# Patient Record
Sex: MALE | Race: WHITE | Employment: FULL TIME | URBAN - METROPOLITAN AREA
[De-identification: names, ages, dates, MRNs, and addresses within clinical notes are randomized per-mention and may not be internally consistent; named-entity substitution may affect disease eponyms.]

---

## 2017-05-10 ENCOUNTER — HOSPITAL ENCOUNTER (OUTPATIENT)
Dept: LAB | Age: 57
Discharge: HOME | End: 2017-05-10

## 2018-08-28 ENCOUNTER — OFFICE VISIT (OUTPATIENT)
Dept: INTERNAL MEDICINE CLINIC | Age: 58
End: 2018-08-28

## 2018-08-28 VITALS
BODY MASS INDEX: 27.64 KG/M2 | OXYGEN SATURATION: 98 % | TEMPERATURE: 98.4 F | HEIGHT: 74 IN | DIASTOLIC BLOOD PRESSURE: 91 MMHG | WEIGHT: 215.4 LBS | RESPIRATION RATE: 17 BRPM | SYSTOLIC BLOOD PRESSURE: 120 MMHG | HEART RATE: 64 BPM

## 2018-08-28 DIAGNOSIS — Z12.11 COLON CANCER SCREENING: ICD-10-CM

## 2018-08-28 DIAGNOSIS — Z13.1 DIABETES MELLITUS SCREENING: ICD-10-CM

## 2018-08-28 DIAGNOSIS — F51.01 PRIMARY INSOMNIA: ICD-10-CM

## 2018-08-28 DIAGNOSIS — N52.8 OTHER MALE ERECTILE DYSFUNCTION: ICD-10-CM

## 2018-08-28 DIAGNOSIS — Z76.89 ENCOUNTER TO ESTABLISH CARE: Primary | ICD-10-CM

## 2018-08-28 DIAGNOSIS — Z13.220 LIPID SCREENING: ICD-10-CM

## 2018-08-28 RX ORDER — ZOLPIDEM TARTRATE 5 MG/1
TABLET ORAL
COMMUNITY
End: 2018-08-28 | Stop reason: SDUPTHER

## 2018-08-28 RX ORDER — FINASTERIDE 1 MG/1
TABLET, FILM COATED ORAL
COMMUNITY
End: 2018-11-12 | Stop reason: SDUPTHER

## 2018-08-28 RX ORDER — TADALAFIL 10 MG/1
TABLET ORAL
COMMUNITY
End: 2018-08-28 | Stop reason: SDUPTHER

## 2018-08-28 RX ORDER — ZOLPIDEM TARTRATE 5 MG/1
5 TABLET ORAL
Qty: 90 TAB | Refills: 1 | Status: SHIPPED | OUTPATIENT
Start: 2018-08-28 | End: 2019-11-18 | Stop reason: SDUPTHER

## 2018-08-28 RX ORDER — MELATONIN
COMMUNITY
End: 2022-06-28

## 2018-08-28 RX ORDER — PROPRANOLOL HYDROCHLORIDE 20 MG/1
TABLET ORAL
COMMUNITY
End: 2019-11-18 | Stop reason: SDUPTHER

## 2018-08-28 RX ORDER — TESTOSTERONE 50 MG/5G
GEL TRANSDERMAL
COMMUNITY
End: 2022-02-21

## 2018-08-28 RX ORDER — TADALAFIL 5 MG/1
TABLET ORAL
COMMUNITY
End: 2019-06-13 | Stop reason: SDUPTHER

## 2018-08-28 RX ORDER — TRETINOIN 0.25 MG/G
CREAM TOPICAL
COMMUNITY
End: 2019-09-26 | Stop reason: SDUPTHER

## 2018-08-28 RX ORDER — ALPRAZOLAM 0.5 MG/1
TABLET ORAL
COMMUNITY
End: 2019-11-18 | Stop reason: SDUPTHER

## 2018-08-28 RX ORDER — LANOLIN ALCOHOL/MO/W.PET/CERES
1 CREAM (GRAM) TOPICAL DAILY
COMMUNITY
End: 2021-12-14 | Stop reason: SDUPTHER

## 2018-08-28 RX ORDER — TADALAFIL 10 MG/1
TABLET ORAL
Qty: 30 TAB | Refills: 3 | Status: SHIPPED | OUTPATIENT
Start: 2018-08-28 | End: 2019-06-13 | Stop reason: SDUPTHER

## 2018-08-28 NOTE — MR AVS SNAPSHOT
[address] [place] [address] 
[phone] Patient: Catalino Gusman MRN: [id number] Visit Information Date & Time Provider Department Dept. Phone Encounter #  
 8/28/2018  2:15 PM Aleida Stewart MD [facility] Internal Medicine [phone] [id number] Follow-up Instructions Return in about 1 month (around 9/28/2018) for Full Physical - 30 minutes appointment. Upcoming Health Maintenance Date Due DTaP/Tdap/Td series (1 - Tdap) 12/8/1979 FOBT Q 1 YEAR AGE 50-75 12/8/2008 Influenza Age 5 to Adult 8/1/2018 Allergies as of 8/28/2018  Review Complete On: 8/28/2018 By: Alan Crawford No Known Allergies Current Immunizations  Never Reviewed No immunizations on file. Not reviewed this visit You Were Diagnosed With   
  
 Codes Comments Encounter to establish care    -  Primary ICD-10-CM: Z76.89 
ICD-9-CM: V65.8 Lipid screening     ICD-10-CM: F29.780 ICD-9-CM: V77.91 Diabetes mellitus screening     ICD-10-CM: Z13.1 ICD-9-CM: V77.1 Colon cancer screening     ICD-10-CM: Z12.11 ICD-9-CM: V76.51 Primary insomnia     ICD-10-CM: F51.01 
ICD-9-CM: 307.42 Other male erectile dysfunction     ICD-10-CM: N52.8 ICD-9-CM: 607.84 Vitals BP Pulse Temp Resp Height(growth percentile) Weight(growth percentile) (!) 120/91 (BP 1 Location: Right arm, BP Patient Position: Sitting) 64 98.4 °F (36.9 °C) (Oral) 17 6' 1.74\" (1.873 m) 215 lb 6.4 oz (97.7 kg) SpO2 BMI Smoking Status 98% 27.85 kg/m2 Never Smoker BMI and BSA Data Body Mass Index Body Surface Area  
 27.85 kg/m 2 2.25 m 2 Preferred Pharmacy Pharmacy Name Phone [address] [phone] Your Updated Medication List  
  
   
This list is accurate as of 8/28/18  3:07 PM.  Always use your most recent med list.  
  
  
  
  
 ALPRAZolam 0.5 mg tablet Commonly known as:  XANAX  
alprazolam 0.5 mg tablet AVITA 0.025 % topical cream  
Generic drug:  tretinoin Avita 0.025 % topical cream  
  
 cholecalciferol 1,000 unit tablet Commonly known as:  VITAMIN D3 Vitamin D3 1,000 unit tablet  
  
 cpap machine kit  
by Does Not Apply route. finasteride 1 mg tablet Commonly known as:  Vlad Chicho Take 1 tablet every day by oral route as directed. glucosamine-chondroitin 500-400 mg Cap Commonly known as:  [address] [place] [address] Take 1 Cap by mouth daily. GLUCOSAMINE-CHONDROITIN COMPLX PO  
1 daily MULTIVITAMIN & MINERAL FORMULA PO  
1 daily  
  
 oxyCODONE 5 mg Tbor Take 1-2 tablet(s) EVERY 4-6 HOURS by oral route. PROBIOTIC 4X 10-15 mg Tbec Generic drug:  B.infantis-B.ani-B.long-B.bifi Take  by mouth.  
  
 propranolol 20 mg tablet Commonly known as:  INDERAL  
propranolol 20 mg tablet SAW PALMETTO EXTRACT PO Take  by mouth. * CIALIS 5 mg tablet Generic drug:  tadalafil Cialis 5 mg tablet * tadalafil 10 mg tablet Commonly known as:  CIALIS Cialis 10 mg tablet  Indications: Erectile Dysfunction TESTIM 50 mg/5 gram (1 %) gel Generic drug:  testosterone Testim 50 mg/5 gram (1 %) transdermal gel  
  
 zolpidem 5 mg tablet Commonly known as:  AMBIEN Take 1 Tab by mouth nightly as needed for Sleep. Max Daily Amount: 5 mg.  
  
 * Notice: This list has 2 medication(s) that are the same as other medications prescribed for you. Read the directions carefully, and ask your doctor or other care provider to review them with you. Prescriptions Printed Refills  
 zolpidem (AMBIEN) 5 mg tablet 1 Sig: Take 1 Tab by mouth nightly as needed for Sleep. Max Daily Amount: 5 mg. Class: Print Route: Oral  
 tadalafil (CIALIS) 10 mg tablet 3 Sig: Cialis 10 mg tablet  Indications: Erectile Dysfunction Class: Print We Performed the Following CBC WITH AUTOMATED DIFF [08541 CPT(R)] COLOGUARD TEST (FECAL DNA COLORECTAL CANCER SCREENING) [18367 CPT(R)] HEMOGLOBIN A1C WITH EAG [87213 CPT(R)] LIPID PANEL [26797 CPT(R)] METABOLIC PANEL, COMPREHENSIVE [18979 CPT(R)] TSH 3RD GENERATION [03207 CPT(R)] Follow-up Instructions Return in about 1 month (around 9/28/2018) for Full Physical - 30 minutes appointment. Introducing [facility]! Margi Parisi introduces [employer] patient portal. Now you can access parts of your medical record, email your doctor's office, and request medication refills online. 1. In your internet browser, go to [email or web address] 2. Click on the First Time User? Click Here link in the Sign In box. You will see the New Member Sign Up page. 3. Enter your [employer] Access Code exactly as it appears below. You will not need to use this code after youve completed the sign-up process. If you do not sign up before the expiration date, you must request a new code. · [employer] Access Code: [facility] Expires: 11/26/2018  1:59 PM 
 
4. Enter the last four digits of your Social Security Number (xxxx) and Date of Birth (mm/dd/yyyy) as indicated and click Submit. You will be taken to the next sign-up page. 5. Create a [employer] ID. This will be your [employer] login ID and cannot be changed, so think of one that is secure and easy to remember. 6. Create a [employer] password. You can change your password at any time. 7. Enter your Password Reset Question and Answer. This can be used at a later time if you forget your password. 8. Enter your e-mail address. You will receive e-mail notification when new information is available in [address] 9. Click Sign Up. You can now view and download portions of your medical record. 10. Click the Download Summary menu link to download a portable copy of your medical information. If you have questions, please visit the Frequently Asked Questions section of the [facility] website. Remember, [employer] is NOT to be used for urgent needs. For medical emergencies, dial 911. Now available from your iPhone and Android! Please provide this summary of care documentation to your next provider. Your primary care clinician is listed as Chiquita Boyd. If you have any questions after today's visit, please call [phone].

## 2018-08-28 NOTE — PROGRESS NOTES
New Patient Evaluation Cameron Coburn is a 59 y.o. male. They are here to establish care with the group and me as a primary care provider. Seen at [facility]. Hermelindo Greenwood previously, the patient is a . The patient has a history of insomnia. He has had this for the past 8-9 years. He is on CPAP. He takes an OTC antihistamine, Ambien and Xanax in rotation. Dr. Brittany Hernandez is his sleep physician. He also has some nocturia. He is on Finasteride and Saw [place]. He is also testosterone. Followed by Dr. Malka Powers at [facility]. He is on propanolol. 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 Sees dermatology. He is taking Vit D3--low vit D. He has had two colonoscopies. PSA normal.   
 
Flu shot every year There are no active problems to display for this patient. Current Outpatient Prescriptions Medication Sig Dispense Refill  ALPRAZolam (XANAX) 0.5 mg tablet alprazolam 0.5 mg tablet  tretinoin (AVITA) 0.025 % topical cream Avita 0.025 % topical cream    
 tadalafil (CIALIS) 10 mg tablet Cialis 10 mg tablet  glucosamine/chondro lee A/C/Mn (GLUCOSAMINE-CHONDROITIN COMPLX PO) 1 daily  finasteride (PROPECIA) 1 mg tablet Take 1 tablet every day by oral route as directed.  propranolol (INDERAL) 20 mg tablet propranolol 20 mg tablet  cholecalciferol (VITAMIN D3) 1,000 unit tablet Vitamin D3 1,000 unit tablet  zolpidem (AMBIEN) 5 mg tablet zolpidem 5 mg tablet  glucosamine-chondroitin (ARTHX) 500-400 mg cap Take 1 Cap by mouth daily.  B.infantis-B.ani-B.long-B.bifi (PROBIOTIC 4X) 10-15 mg TbEC Take  by mouth.  saw palmetto xtr/zinc picolin (SAW PALMETTO EXTRACT PO) Take  by mouth.  cpap machine kit by Does Not Apply route. No Known Allergies No past medical history on file. Past Surgical History:  
Procedure Laterality Date  HX HERNIA REPAIR    
 HX VASECTOMY  HX VEIN STRIPPING    
  KNEE SCOPE, Vainupea 50 TRANSPLANT Bilateral   
 
Family History Problem Relation Age of Onset  Heart Disease Mother  Hypertension Mother  Cancer Father Social History Substance Use Topics  Smoking status: Never Smoker  Smokeless tobacco: Never Used  Alcohol use 4.2 oz/week 7 Standard drinks or equivalent per week Health Maintenance Topic Date Due  
 Hepatitis C Screening  1960  DTaP/Tdap/Td series (1 - Tdap) 12/08/1979  
 FOBT Q 1 YEAR AGE 50-75  12/08/2008  Influenza Age 5 to Adult  08/01/2018 Review of Systems Constitutional: Negative. Respiratory: Negative. Cardiovascular: Negative. Gastrointestinal: Negative. Psychiatric/Behavioral: The patient has insomnia. Visit Vitals  BP (!) 120/91 (BP 1 Location: Right arm, BP Patient Position: Sitting)  Pulse 64  Temp 98.4 °F (36.9 °C) (Oral)  Resp 17  Ht 6' 1.74\" (1.873 m)  Wt 215 lb 6.4 oz (97.7 kg)  SpO2 98%  BMI 27.85 kg/m2 Physical Exam  
Constitutional: No distress. Cardiovascular: Normal rate and regular rhythm. Pulmonary/Chest: Effort normal and breath sounds normal.  
 
 
 
 
ASSESSMENT/PLAN Diagnoses and all orders for this visit: 1. Encounter to establish care 2. Lipid screening -     LIPID PANEL 3. Diabetes mellitus screening 
-     CBC WITH AUTOMATED DIFF 
-     METABOLIC PANEL, COMPREHENSIVE 
-     TSH 3RD GENERATION 
-     HEMOGLOBIN A1C WITH EAG 4. Colon cancer screening 
-     COLOGUARD TEST (FECAL DNA COLORECTAL CANCER SCREENING) 5. Primary insomnia 
-     zolpidem (AMBIEN) 5 mg tablet; Take 1 Tab by mouth nightly as needed for Sleep. Max Daily Amount: 5 mg. 
 
6. Other male erectile dysfunction 
-     tadalafil (CIALIS) 10 mg tablet; Cialis 10 mg tablet  Indications: Erectile Dysfunction Follow-up Disposition: 
Return in about 1 month (around 9/28/2018) for Full Physical - 30 minutes appointment. -Discussed with the patient to continue the current plan of care. We will obtain baseline labwork and determine if any adjustments need to be done. We will also await the records of the previous PCP to ascertain further details of the patient's history. The patient agrees with and understands the plan of care. All questions have been answered.

## 2018-10-25 LAB
ALBUMIN SERPL-MCNC: 4.8 G/DL (ref 3.5–5.5)
ALBUMIN/GLOB SERPL: 2.1 {RATIO} (ref 1.2–2.2)
ALP SERPL-CCNC: 67 IU/L (ref 39–117)
ALT SERPL-CCNC: 29 IU/L (ref 0–44)
AST SERPL-CCNC: 26 IU/L (ref 0–40)
BASOPHILS # BLD AUTO: 0 X10E3/UL (ref 0–0.2)
BASOPHILS NFR BLD AUTO: 0 %
BILIRUB SERPL-MCNC: 1.1 MG/DL (ref 0–1.2)
BUN SERPL-MCNC: 18 MG/DL (ref 6–24)
BUN/CREAT SERPL: 15 (ref 9–20)
CALCIUM SERPL-MCNC: 9.6 MG/DL (ref 8.7–10.2)
CHLORIDE SERPL-SCNC: 99 MMOL/L (ref 96–106)
CHOLEST SERPL-MCNC: 231 MG/DL (ref 100–199)
CO2 SERPL-SCNC: 24 MMOL/L (ref 20–29)
CREAT SERPL-MCNC: 1.17 MG/DL (ref 0.76–1.27)
EOSINOPHIL # BLD AUTO: 0 X10E3/UL (ref 0–0.4)
EOSINOPHIL NFR BLD AUTO: 0 %
ERYTHROCYTE [DISTWIDTH] IN BLOOD BY AUTOMATED COUNT: 13 % (ref 12.3–15.4)
EST. AVERAGE GLUCOSE BLD GHB EST-MCNC: 114 MG/DL
GLOBULIN SER CALC-MCNC: 2.3 G/DL (ref 1.5–4.5)
GLUCOSE SERPL-MCNC: 100 MG/DL (ref 65–99)
HBA1C MFR BLD: 5.6 % (ref 4.8–5.6)
HCT VFR BLD AUTO: 45.1 % (ref 37.5–51)
HDLC SERPL-MCNC: 68 MG/DL
HGB BLD-MCNC: 15.3 G/DL (ref 13–17.7)
IMM GRANULOCYTES # BLD: 0 X10E3/UL (ref 0–0.1)
IMM GRANULOCYTES NFR BLD: 0 %
LDLC SERPL CALC-MCNC: 147 MG/DL (ref 0–99)
LYMPHOCYTES # BLD AUTO: 1.5 X10E3/UL (ref 0.7–3.1)
LYMPHOCYTES NFR BLD AUTO: 14 %
MCH RBC QN AUTO: 30.9 PG (ref 26.6–33)
MCHC RBC AUTO-ENTMCNC: 33.9 G/DL (ref 31.5–35.7)
MCV RBC AUTO: 91 FL (ref 79–97)
MONOCYTES # BLD AUTO: 0.2 X10E3/UL (ref 0.1–0.9)
MONOCYTES NFR BLD AUTO: 2 %
NEUTROPHILS # BLD AUTO: 8.6 X10E3/UL (ref 1.4–7)
NEUTROPHILS NFR BLD AUTO: 84 %
PLATELET # BLD AUTO: 269 X10E3/UL (ref 150–379)
POTASSIUM SERPL-SCNC: 4.6 MMOL/L (ref 3.5–5.2)
PROT SERPL-MCNC: 7.1 G/DL (ref 6–8.5)
RBC # BLD AUTO: 4.95 X10E6/UL (ref 4.14–5.8)
SODIUM SERPL-SCNC: 139 MMOL/L (ref 134–144)
TRIGL SERPL-MCNC: 82 MG/DL (ref 0–149)
TSH SERPL DL<=0.005 MIU/L-ACNC: 1.02 UIU/ML (ref 0.45–4.5)
VLDLC SERPL CALC-MCNC: 16 MG/DL (ref 5–40)
WBC # BLD AUTO: 10.3 X10E3/UL (ref 3.4–10.8)

## 2018-11-12 ENCOUNTER — OFFICE VISIT (OUTPATIENT)
Dept: INTERNAL MEDICINE CLINIC | Age: 58
End: 2018-11-12

## 2018-11-12 VITALS
DIASTOLIC BLOOD PRESSURE: 78 MMHG | RESPIRATION RATE: 18 BRPM | HEIGHT: 74 IN | TEMPERATURE: 98.6 F | WEIGHT: 216.1 LBS | HEART RATE: 59 BPM | BODY MASS INDEX: 27.73 KG/M2 | SYSTOLIC BLOOD PRESSURE: 110 MMHG | OXYGEN SATURATION: 96 %

## 2018-11-12 DIAGNOSIS — L65.9 HAIR LOSS: ICD-10-CM

## 2018-11-12 DIAGNOSIS — E78.00 PURE HYPERCHOLESTEROLEMIA: Primary | ICD-10-CM

## 2018-11-12 RX ORDER — FINASTERIDE 1 MG/1
TABLET, FILM COATED ORAL
Qty: 90 TAB | Refills: 1 | Status: SHIPPED | OUTPATIENT
Start: 2018-11-12 | End: 2019-09-26 | Stop reason: CLARIF

## 2018-11-12 NOTE — PROGRESS NOTES
Follow Up Visit    Deloris Paiz is a 59 y.o. male. he presents for Labs (review)      The patient comes for review of lab work. He had had labs done late last month. These are notable for an elevation in his LDL. This was up to a level of 147 with a total cholesterol of 231. He has not been as consistent with his diet as he would like to be. He reports that he will attempt to do better regarding this in the coming months. Additionally, the patient reports hair loss. He has been on Propecia with some improvement in his symptoms. He is asking for a prescription for this medication      There is no problem list on file for this patient. Prior to Admission medications    Medication Sig Start Date End Date Taking? Authorizing Provider   ALPRAZolam Heather Gio) 0.5 mg tablet alprazolam 0.5 mg tablet   Yes Provider, Historical   tretinoin (AVITA) 0.025 % topical cream Avita 0.025 % topical cream   Yes Provider, Historical   glucosamine/chondro lee A/C/Mn (GLUCOSAMINE-CHONDROITIN COMPLX PO) 1 daily   Yes Provider, Historical   finasteride (PROPECIA) 1 mg tablet Take 1 tablet every day by oral route as directed. Yes Provider, Historical   propranolol (INDERAL) 20 mg tablet propranolol 20 mg tablet   Yes Provider, Historical   cholecalciferol (VITAMIN D3) 1,000 unit tablet Vitamin D3 1,000 unit tablet   Yes Provider, Historical   glucosamine-chondroitin (ARTHX) 500-400 mg cap Take 1 Cap by mouth daily. Yes Provider, Historical   saw palmetto xtr/zinc picolin (SAW PALMETTO EXTRACT PO) Take  by mouth. Yes Provider, Historical   zolpidem (AMBIEN) 5 mg tablet Take 1 Tab by mouth nightly as needed for Sleep. Max Daily Amount: 5 mg. 8/28/18  Yes Edu Hewitt MD   tadalafil (CIALIS) 10 mg tablet Cialis 10 mg tablet  Indications: Erectile Dysfunction 8/28/18  Yes Edu Hewitt MD   cpap machine kit by Does Not Apply route.    Yes Provider, Historical   testosterone (TESTIM) 50 mg/5 gram (1 %) gel Testim 50 mg/5 gram (1 %) transdermal gel   Yes Provider, Historical   tadalafil (CIALIS) 5 mg tablet Cialis 5 mg tablet   Yes Provider, Historical         Health Maintenance   Topic Date Due    Cologuard Q 3 Years  12/08/2008    Shingrix Vaccine Age 50> (2 of 2) 12/10/2018    DTaP/Tdap/Td series (2 - Td) 10/15/2028    Hepatitis C Screening  Completed    Influenza Age 5 to Adult  Completed       Review of Systems   Constitutional: Negative. Respiratory: Negative. Cardiovascular: Negative. Gastrointestinal: Negative. Visit Vitals  /78 (BP 1 Location: Right arm, BP Patient Position: Sitting)   Pulse (!) 59   Temp 98.6 °F (37 °C) (Oral)   Resp 18   Ht 6' 1.74\" (1.873 m)   Wt 216 lb 1.6 oz (98 kg)   SpO2 96%   BMI 27.94 kg/m²       Physical Exam   Constitutional: He is oriented to person, place, and time. He appears well-developed and well-nourished. Cardiovascular: Normal rate and regular rhythm. Pulmonary/Chest: Effort normal and breath sounds normal.   Abdominal: Soft. Bowel sounds are normal.   Neurological: He is alert and oriented to person, place, and time. ASSESSMENT/PLAN    Diagnoses and all orders for this visit:    1. Pure hypercholesterolemia -discussed with the patient, we will need to repeat this panel in the coming months. He will work on dietary changes.  -     LIPID PANEL    2. Hair loss  -     finasteride (PROPECIA) 1 mg tablet; Take 1 tablet every day by oral route as directed. Follow-up Disposition:  Return in about 3 months (around 2/12/2019).

## 2019-06-13 DIAGNOSIS — N52.8 OTHER MALE ERECTILE DYSFUNCTION: ICD-10-CM

## 2019-06-13 RX ORDER — TADALAFIL 10 MG/1
TABLET ORAL
Qty: 30 TAB | Refills: 3 | Status: SHIPPED | OUTPATIENT
Start: 2019-06-13 | End: 2021-12-14 | Stop reason: SDUPTHER

## 2019-06-14 ENCOUNTER — TELEPHONE (OUTPATIENT)
Dept: INTERNAL MEDICINE CLINIC | Age: 59
End: 2019-06-14

## 2019-06-14 NOTE — TELEPHONE ENCOUNTER
Received VRBO from Dr France Reyna to phone in Cialis 10 mg,   30 tab/3 refill. Phone in to [facility]. Patient notified. Need to contact mail order to confirm address. Pt verbalized understanding.

## 2019-06-18 ENCOUNTER — TELEPHONE (OUTPATIENT)
Dept: INTERNAL MEDICINE CLINIC | Age: 59
End: 2019-06-18

## 2019-06-24 ENCOUNTER — TELEPHONE (OUTPATIENT)
Dept: INTERNAL MEDICINE CLINIC | Age: 59
End: 2019-06-24

## 2019-09-26 ENCOUNTER — OFFICE VISIT (OUTPATIENT)
Dept: INTERNAL MEDICINE CLINIC | Age: 59
End: 2019-09-26

## 2019-09-26 VITALS
SYSTOLIC BLOOD PRESSURE: 120 MMHG | BODY MASS INDEX: 26.9 KG/M2 | TEMPERATURE: 98.5 F | RESPIRATION RATE: 19 BRPM | HEIGHT: 74 IN | OXYGEN SATURATION: 97 % | WEIGHT: 209.6 LBS | DIASTOLIC BLOOD PRESSURE: 79 MMHG | HEART RATE: 55 BPM

## 2019-09-26 DIAGNOSIS — N40.0 BENIGN PROSTATIC HYPERPLASIA, UNSPECIFIED WHETHER LOWER URINARY TRACT SYMPTOMS PRESENT: ICD-10-CM

## 2019-09-26 DIAGNOSIS — Z01.818 PREOP EXAM FOR INTERNAL MEDICINE: Primary | ICD-10-CM

## 2019-09-26 DIAGNOSIS — L70.9 ACNE, UNSPECIFIED ACNE TYPE: ICD-10-CM

## 2019-09-26 DIAGNOSIS — L65.9 HAIR LOSS: ICD-10-CM

## 2019-09-26 RX ORDER — FINASTERIDE 1 MG/1
TABLET, FILM COATED ORAL
Qty: 90 TAB | Refills: 1 | Status: CANCELLED | OUTPATIENT
Start: 2019-09-26

## 2019-09-26 RX ORDER — FINASTERIDE 5 MG/1
2.5 TABLET, FILM COATED ORAL DAILY
Qty: 60 TAB | Refills: 3 | Status: SHIPPED | OUTPATIENT
Start: 2019-09-26 | End: 2020-12-11 | Stop reason: SDUPTHER

## 2019-09-26 RX ORDER — TRETINOIN 0.25 MG/G
CREAM TOPICAL
Qty: 20 G | Refills: 3 | Status: SHIPPED | OUTPATIENT
Start: 2019-09-26 | End: 2021-12-14 | Stop reason: SDUPTHER

## 2019-09-26 NOTE — PROGRESS NOTES
[facility]    SAMUEL MENDEZ is 59 y.o. male (1960) who presents for preoperative evaluation. Procedure/Surgery: Cataract surgery   Date of Procedure/Surgery: 10/9/19 and 10/23/19   Surgeon: Dr. Lieutenant Dick: Shana Gonzalez  Primary Physician: Jodeane Severin. MD    The patient reports feeling generally well. He has no acute problems. We discussed him having some discomfort in his chest when he gets emotionally distressed. However, he has no chest discomfort with exercise. He denies shortness of breath. Patient Active Problem List   Diagnosis Code    Hair loss L65.9    Acne L70.9    Benign prostatic hyperplasia N40.0       Past Surgical History:   Procedure Laterality Date    HX HERNIA REPAIR      HX VASECTOMY      HX VEIN STRIPPING      KNEE SCOPE, Vainupea 50 TRANSPLANT Bilateral          Prior to Admission medications    Medication Sig Start Date End Date Taking? Authorizing Provider   tadalafil (CIALIS) 10 mg tablet Cialis 10 mg tablet  Indications: Inability to have an Erection 6/13/19  Yes Sohan Moyer MD   finasteride (PROPECIA) 1 mg tablet Take 1 tablet every day by oral route as directed. 11/12/18  Yes Sohan Moyer MD   ALPRAZolam Arthea Donath) 0.5 mg tablet alprazolam 0.5 mg tablet   Yes Provider, Historical   tretinoin (AVITA) 0.025 % topical cream Avita 0.025 % topical cream   Yes Provider, Historical   glucosamine/chondro lee A/C/Mn (GLUCOSAMINE-CHONDROITIN COMPLX PO) 1 daily   Yes Provider, Historical   propranolol (INDERAL) 20 mg tablet propranolol 20 mg tablet   Yes Provider, Historical   cholecalciferol (VITAMIN D3) 1,000 unit tablet Vitamin D3 1,000 unit tablet   Yes Provider, Historical   glucosamine-chondroitin (ARTHX) 500-400 mg cap Take 1 Cap by mouth daily. Yes Provider, Historical   saw palmetto xtr/zinc picolin (SAW PALMETTO EXTRACT PO) Take  by mouth.    Yes Provider, Historical   zolpidem (AMBIEN) 5 mg tablet Take 1 Tab by mouth nightly as needed for Sleep. Max Daily Amount: 5 mg. 8/28/18  Yes Aris Hung MD   cpap machine kit by Does Not Apply route. Yes Provider, Historical   testosterone (TESTIM) 50 mg/5 gram (1 %) gel Testim 50 mg/5 gram (1 %) transdermal gel   Yes Provider, Historical       Review of Systems   Constitutional: Negative. Respiratory: Negative. Cardiovascular: Negative. Gastrointestinal: Negative. Latex Allergy: None  Recent use of: No recent use of aspirin (ASA), NSAIDS or steroids  Tetanus up to date: last tetanus booster within 10 years  Anesthesia Complications: None  History of abnormal bleeding : None  History of Blood Transfusions: no  Health Care Directive or Living Will: yes and he will provide a copy    Visit Vitals  /79 (BP 1 Location: Right arm, BP Patient Position: Sitting)   Pulse (!) 55   Temp 98.5 °F (36.9 °C) (Oral)   Resp 19   Ht 6' 1.74\" (1.873 m)   Wt 209 lb 9.6 oz (95.1 kg)   SpO2 97%   BMI 27.10 kg/m²         Physical Exam   Constitutional: No distress. Cardiovascular: Normal rate and regular rhythm. Pulmonary/Chest: Effort normal and breath sounds normal.   Abdominal: Soft. Bowel sounds are normal.         Labs: New labs are pending. EKG: normal EKG, normal sinus rhythm, no ischemic change. Evidence of J-point elevation. Diagnoses and all orders for this visit:    1. Preop exam for internal medicine  -     CBC WITH AUTOMATED DIFF  -     METABOLIC PANEL, COMPREHENSIVE  -     LIPID PANEL  -     PSA SCREENING (SCREENING)  -     TSH 3RD GENERATION    2. Hair loss  -     finasteride (PROPECIA) 1 mg tablet; Take 1 tablet every day by oral route as directed. 3. Acne, unspecified acne type  -     tretinoin (AVITA) 0.025 % topical cream; Avita 0.025 % topical cream        After reviewing the patient's history & exam he is low risk for cardiac complications.   No contraindications to planned surgery

## 2019-09-27 ENCOUNTER — DOCUMENTATION ONLY (OUTPATIENT)
Dept: INTERNAL MEDICINE CLINIC | Age: 59
End: 2019-09-27

## 2019-10-17 ENCOUNTER — PATIENT MESSAGE (OUTPATIENT)
Dept: INTERNAL MEDICINE CLINIC | Age: 59
End: 2019-10-17

## 2019-10-17 DIAGNOSIS — R79.89 LOW VITAMIN D LEVEL: ICD-10-CM

## 2019-10-17 DIAGNOSIS — Z13.1 DIABETES MELLITUS SCREENING: Primary | ICD-10-CM

## 2019-10-17 DIAGNOSIS — Z13.220 SCREENING FOR LIPOID DISORDERS: ICD-10-CM

## 2019-10-17 DIAGNOSIS — Z13.29 SCREENING FOR THYROID DISORDER: ICD-10-CM

## 2019-11-05 LAB
ALBUMIN SERPL-MCNC: 4.8 G/DL (ref 3.6–4.8)
ALBUMIN/GLOB SERPL: 2.5 {RATIO} (ref 1.2–2.2)
ALP SERPL-CCNC: 68 IU/L (ref 39–117)
ALT SERPL-CCNC: 35 IU/L (ref 0–44)
AST SERPL-CCNC: 30 IU/L (ref 0–40)
BASOPHILS # BLD AUTO: 0 X10E3/UL (ref 0–0.2)
BASOPHILS NFR BLD AUTO: 1 %
BILIRUB SERPL-MCNC: 0.8 MG/DL (ref 0–1.2)
BUN SERPL-MCNC: 19 MG/DL (ref 8–27)
BUN/CREAT SERPL: 17 (ref 10–24)
CALCIUM SERPL-MCNC: 9.3 MG/DL (ref 8.6–10.2)
CHLORIDE SERPL-SCNC: 100 MMOL/L (ref 96–106)
CHOLEST SERPL-MCNC: 199 MG/DL (ref 100–199)
CO2 SERPL-SCNC: 26 MMOL/L (ref 20–29)
CREAT SERPL-MCNC: 1.12 MG/DL (ref 0.76–1.27)
EOSINOPHIL # BLD AUTO: 0.1 X10E3/UL (ref 0–0.4)
EOSINOPHIL NFR BLD AUTO: 2 %
ERYTHROCYTE [DISTWIDTH] IN BLOOD BY AUTOMATED COUNT: 11.9 % (ref 12.3–15.4)
GLOBULIN SER CALC-MCNC: 1.9 G/DL (ref 1.5–4.5)
GLUCOSE SERPL-MCNC: 103 MG/DL (ref 65–99)
HCT VFR BLD AUTO: 44.4 % (ref 37.5–51)
HDLC SERPL-MCNC: 59 MG/DL
HGB BLD-MCNC: 14.9 G/DL (ref 13–17.7)
IMM GRANULOCYTES # BLD AUTO: 0 X10E3/UL (ref 0–0.1)
IMM GRANULOCYTES NFR BLD AUTO: 0 %
LDLC SERPL CALC-MCNC: 121 MG/DL (ref 0–99)
LYMPHOCYTES # BLD AUTO: 1.5 X10E3/UL (ref 0.7–3.1)
LYMPHOCYTES NFR BLD AUTO: 32 %
MCH RBC QN AUTO: 30.9 PG (ref 26.6–33)
MCHC RBC AUTO-ENTMCNC: 33.6 G/DL (ref 31.5–35.7)
MCV RBC AUTO: 92 FL (ref 79–97)
MONOCYTES # BLD AUTO: 0.4 X10E3/UL (ref 0.1–0.9)
MONOCYTES NFR BLD AUTO: 8 %
NEUTROPHILS # BLD AUTO: 2.7 X10E3/UL (ref 1.4–7)
NEUTROPHILS NFR BLD AUTO: 57 %
PLATELET # BLD AUTO: 231 X10E3/UL (ref 150–450)
POTASSIUM SERPL-SCNC: 4.3 MMOL/L (ref 3.5–5.2)
PROT SERPL-MCNC: 6.7 G/DL (ref 6–8.5)
PSA SERPL-MCNC: 0.3 NG/ML (ref 0–4)
RBC # BLD AUTO: 4.82 X10E6/UL (ref 4.14–5.8)
SODIUM SERPL-SCNC: 141 MMOL/L (ref 134–144)
TRIGL SERPL-MCNC: 96 MG/DL (ref 0–149)
TSH SERPL DL<=0.005 MIU/L-ACNC: 2.78 UIU/ML (ref 0.45–4.5)
VLDLC SERPL CALC-MCNC: 19 MG/DL (ref 5–40)
WBC # BLD AUTO: 4.7 X10E3/UL (ref 3.4–10.8)

## 2019-11-18 ENCOUNTER — OFFICE VISIT (OUTPATIENT)
Dept: INTERNAL MEDICINE CLINIC | Age: 59
End: 2019-11-18

## 2019-11-18 VITALS
SYSTOLIC BLOOD PRESSURE: 121 MMHG | TEMPERATURE: 98.4 F | WEIGHT: 209.2 LBS | OXYGEN SATURATION: 97 % | RESPIRATION RATE: 19 BRPM | HEART RATE: 67 BPM | BODY MASS INDEX: 26.85 KG/M2 | DIASTOLIC BLOOD PRESSURE: 90 MMHG | HEIGHT: 74 IN

## 2019-11-18 DIAGNOSIS — F51.01 PRIMARY INSOMNIA: ICD-10-CM

## 2019-11-18 DIAGNOSIS — Z00.00 WELL ADULT EXAM: Primary | ICD-10-CM

## 2019-11-18 DIAGNOSIS — R79.89 LOW VITAMIN D LEVEL: ICD-10-CM

## 2019-11-18 DIAGNOSIS — F41.9 ANXIETY: ICD-10-CM

## 2019-11-18 RX ORDER — ZOLPIDEM TARTRATE 5 MG/1
5 TABLET ORAL
Qty: 90 TAB | Refills: 1 | Status: SHIPPED | OUTPATIENT
Start: 2019-11-18 | End: 2019-11-19 | Stop reason: SDUPTHER

## 2019-11-18 RX ORDER — PROPRANOLOL HYDROCHLORIDE 20 MG/1
TABLET ORAL
Qty: 30 TAB | Refills: 2 | Status: SHIPPED | OUTPATIENT
Start: 2019-11-18 | End: 2019-11-19 | Stop reason: SDUPTHER

## 2019-11-18 RX ORDER — ALPRAZOLAM 0.5 MG/1
TABLET ORAL
Qty: 30 TAB | Refills: 1 | Status: SHIPPED | OUTPATIENT
Start: 2019-11-18 | End: 2019-11-19 | Stop reason: SDUPTHER

## 2019-11-18 NOTE — PROGRESS NOTES
Comprehensive Physical Examination    Lenard Chaudhry is a 59 y.o. male. he presents for a comprehensive physical examination. The pt reports feeling well. No acute issues. He has had cataract surgery and tolerating this well. He has had labs drawn previously. We reviewed the results. Improved cholesterol level. He has worked on managing his diet. Patient Active Problem List    Diagnosis Date Noted    Hair loss 09/26/2019    Acne 09/26/2019    Benign prostatic hyperplasia 09/26/2019     Current Outpatient Medications   Medication Sig Dispense Refill    tretinoin (AVITA) 0.025 % topical cream Avita 0.025 % topical cream 20 g 3    finasteride (PROSCAR) 5 mg tablet Take 0.5 Tabs by mouth daily. 60 Tab 3    tadalafil (CIALIS) 10 mg tablet Cialis 10 mg tablet  Indications: Inability to have an Erection 30 Tab 3    ALPRAZolam (XANAX) 0.5 mg tablet alprazolam 0.5 mg tablet      glucosamine/chondro lee A/C/Mn (GLUCOSAMINE-CHONDROITIN COMPLX PO) 1 daily      propranolol (INDERAL) 20 mg tablet propranolol 20 mg tablet      cholecalciferol (VITAMIN D3) 1,000 unit tablet Vitamin D3 1,000 unit tablet      glucosamine-chondroitin (ARTHX) 500-400 mg cap Take 1 Cap by mouth daily.  saw palmetto xtr/zinc picolin (SAW PALMETTO EXTRACT PO) Take  by mouth.  zolpidem (AMBIEN) 5 mg tablet Take 1 Tab by mouth nightly as needed for Sleep. Max Daily Amount: 5 mg. 90 Tab 1    cpap machine kit by Does Not Apply route.  testosterone (TESTIM) 50 mg/5 gram (1 %) gel Testim 50 mg/5 gram (1 %) transdermal gel       No Known Allergies  History reviewed. No pertinent past medical history.   Past Surgical History:   Procedure Laterality Date    HX CATARACT REMOVAL Bilateral 11/2019    HX HERNIA REPAIR      HX VASECTOMY      HX VEIN STRIPPING      KNEE SCOPE, MENISC TRANSPLANT Bilateral      Family History   Problem Relation Age of Onset    Heart Disease Mother     Hypertension Mother     Cancer Father Social History     Tobacco Use    Smoking status: Never Smoker    Smokeless tobacco: Never Used   Substance Use Topics    Alcohol use: Yes     Alcohol/week: 7.0 standard drinks     Types: 7 Standard drinks or equivalent per week        Health Maintenance   Topic Date Due    Gail CROOKS 3 Years  11/01/2021    DTaP/Tdap/Td series (2 - Td) 10/15/2028    Hepatitis C Screening  Completed    Shingrix Vaccine Age 50>  Completed    Influenza Age 5 to Adult  Completed    Pneumococcal 0-64 years  Aged Out         Review of Systems   Constitutional: Negative. Respiratory: Negative. Cardiovascular: Negative. Visit Vitals  /90 (BP 1 Location: Left arm, BP Patient Position: Sitting)   Pulse 67   Temp 98.4 °F (36.9 °C) (Oral)   Resp 19   Ht 6' 1.82\" (1.875 m)   Wt 209 lb 3.2 oz (94.9 kg)   SpO2 97%   BMI 26.99 kg/m²       Physical Exam   Constitutional: He is well-developed, well-nourished, and in no distress. No distress. HENT:   Mouth/Throat: Oropharynx is clear and moist.   Neck: Normal range of motion. Cardiovascular: Normal rate and regular rhythm. No murmur heard. Pulmonary/Chest: Effort normal and breath sounds normal.   Abdominal: Soft. Bowel sounds are normal.   Musculoskeletal: He exhibits no edema. Lymphadenopathy:     He has no cervical adenopathy. ASSESSMENT/PLAN  Diagnoses and all orders for this visit:    1. Well adult exam    2. Primary insomnia  -     zolpidem (AMBIEN) 5 mg tablet; Take 1 Tab by mouth nightly as needed for Sleep. Max Daily Amount: 5 mg.    3. Anxiety  -     ALPRAZolam (XANAX) 0.5 mg tablet; alprazolam 0.5 mg tablet  -     propranolol (INDERAL) 20 mg tablet; propranolol 20 mg tablet    4. Low vitamin D level  -     VITAMIN D, 25 HYDROXY      Follow-up and Dispositions    · Return in about 6 months (around 5/18/2020).

## 2019-11-18 NOTE — Clinical Note
Can we see if the med orders can be sent to the patient's mail order pharmacy? If not, he has printed copies of the orders and needs to be told to forward the order to his mail order company. Thanks!

## 2019-11-19 DIAGNOSIS — F41.9 ANXIETY: ICD-10-CM

## 2019-11-19 DIAGNOSIS — F51.01 PRIMARY INSOMNIA: ICD-10-CM

## 2019-11-19 RX ORDER — PROPRANOLOL HYDROCHLORIDE 20 MG/1
TABLET ORAL
Qty: 90 TAB | Refills: 1 | Status: SHIPPED | OUTPATIENT
Start: 2019-11-19 | End: 2019-11-20 | Stop reason: SDUPTHER

## 2019-11-19 RX ORDER — ALPRAZOLAM 0.5 MG/1
TABLET ORAL
Qty: 30 TAB | Refills: 1 | Status: SHIPPED | OUTPATIENT
Start: 2019-11-19 | End: 2019-11-20 | Stop reason: SDUPTHER

## 2019-11-19 RX ORDER — ZOLPIDEM TARTRATE 5 MG/1
5 TABLET ORAL
Qty: 90 TAB | Refills: 1 | Status: SHIPPED | OUTPATIENT
Start: 2019-11-19 | End: 2020-12-11 | Stop reason: SDUPTHER

## 2019-11-19 NOTE — TELEPHONE ENCOUNTER
Called and spoke with representative  EDMUND Acosta, she stated medication can be e-scribe to  @ [phone], when order is processed will submit to correct insurance carrier Dave. EDMUND Acosta is distribution center and does not accept e-scribe.

## 2019-11-20 DIAGNOSIS — F41.9 ANXIETY: ICD-10-CM

## 2019-11-21 RX ORDER — ALPRAZOLAM 0.5 MG/1
0.5 TABLET ORAL
Qty: 30 TAB | Refills: 1 | Status: SHIPPED | OUTPATIENT
Start: 2019-11-21 | End: 2020-12-11 | Stop reason: SDUPTHER

## 2019-11-21 RX ORDER — PROPRANOLOL HYDROCHLORIDE 20 MG/1
20 TABLET ORAL DAILY
Qty: 90 TAB | Refills: 1 | Status: SHIPPED | OUTPATIENT
Start: 2019-11-21

## 2019-12-31 LAB — 25(OH)D3+25(OH)D2 SERPL-MCNC: 44 NG/ML (ref 30–100)

## 2020-03-23 LAB — EF %, EXTERNAL: NORMAL

## 2020-05-26 ENCOUNTER — VIRTUAL VISIT (OUTPATIENT)
Dept: NEUROLOGY | Age: 60
End: 2020-05-26

## 2020-05-26 VITALS — HEIGHT: 72 IN | BODY MASS INDEX: 28.31 KG/M2 | RESPIRATION RATE: 18 BRPM | WEIGHT: 209 LBS

## 2020-05-26 DIAGNOSIS — M79.10 MYALGIA: ICD-10-CM

## 2020-05-26 DIAGNOSIS — M62.89 MUSCLE STIFFNESS: Primary | ICD-10-CM

## 2020-05-26 NOTE — PROGRESS NOTES
NEUROSCIENCE [place]   NEW PATIENT EVALUATION/CONSULTATION       PATIENT NAME: Christian Mireles    MRN: [id number]    REASON FOR CONSULTATION: Muscle pain    05/26/20    This is a telemedicine visit that was performed with the originating site at [address] [place] [address] and the distant site at patient's home. Verbal consent to participate in video visit was obtained. The patient was identified by name and date of birth. This visit occurred during the Coronavirus [phone]) [facility] Emergency. I discussed with the patient the nature of our telemedicine visits, that:     I would evaluate the patient and recommend diagnostics and treatments based on my assessment   Our sessions are not being recorded and that personal health information is protected   Our team would provide follow up care in person if/when the patient needs it    Previous records (physician notes, laboratory reports, and radiology reports) and imaging studies were reviewed and summarized. My recommendations will be communicated back to the patient's physician(s) via electronic medical record and/or by [id number]. HISTORY OF PRESENT ILLNESS:  Christian Mireles is a 62 y.o.  male presenting for evaluation of muscle pain. Symptoms began 1-2 years ago, intermittent. He states his arm and chest muscles tighten periodically, typically in a bilateral pattern. He experiences squeezing/tightening of the muscles for several seconds. He has not noted that symptoms are induced by activity or exercise. He has noted that symptoms are triggered when he suddenly attempts to stand or move. No noted muscle atrophy, myalgias, muscle weakness, fasciculations, paresthesias, cervicalgia. He admits to occasional difficulty lowering gaze when looking upwards for sustained periods of time. Thyroid levels previously testing and normal.  PCP performed EKG previously which was normal per patient.         PAST MEDICAL HISTORY:  pSVT    PAST SURGICAL HISTORY:  Past Surgical History:   Procedure Laterality Date    HX CATARACT REMOVAL Bilateral 11/2019    HX HERNIA REPAIR      HX VASECTOMY      HX VEIN STRIPPING      KNEE SCOPE, Vainupea 50 TRANSPLANT Bilateral        FAMILY HISTORY:   Family History   Problem Relation Age of Onset    Heart Disease Mother     Hypertension Mother     Cancer Father    No FHx neuromuscular disease      SOCIAL HISTORY:  Social History     Socioeconomic History    Marital status:      Spouse name: Not on file    Number of children: Not on file    Years of education: Not on file    Highest education level: Not on file   Tobacco Use    Smoking status: Never Smoker    Smokeless tobacco: Never Used   Substance and Sexual Activity    Alcohol use: Yes     Alcohol/week: 7.0 standard drinks     Types: 7 Standard drinks or equivalent per week    Drug use: No    Sexual activity: Yes     Partners: Female         MEDICATIONS:   Current Outpatient Medications   Medication Sig Dispense Refill    ALPRAZolam (XANAX) 0.5 mg tablet Take 1 Tab by mouth nightly as needed for Anxiety. Max Daily Amount: 0.5 mg. 30 Tab 1    propranolol (INDERAL) 20 mg tablet Take 1 Tab by mouth daily. 90 Tab 1    zolpidem (AMBIEN) 5 mg tablet Take 1 Tab by mouth nightly as needed for Sleep. Max Daily Amount: 5 mg. 90 Tab 1    tretinoin (AVITA) 0.025 % topical cream Avita 0.025 % topical cream 20 g 3    finasteride (PROSCAR) 5 mg tablet Take 0.5 Tabs by mouth daily. 60 Tab 3    tadalafil (CIALIS) 10 mg tablet Cialis 10 mg tablet  Indications: Inability to have an Erection 30 Tab 3    glucosamine/chondro lee A/C/Mn (GLUCOSAMINE-CHONDROITIN COMPLX PO) 1 daily      cholecalciferol (VITAMIN D3) 1,000 unit tablet Vitamin D3 1,000 unit tablet      glucosamine-chondroitin (ARTHX) 500-400 mg cap Take 1 Cap by mouth daily.  saw palmetto xtr/zinc picolin (SAW PALMETTO EXTRACT PO) Take  by mouth.  cpap machine kit by Does Not Apply route.       testosterone (TESTIM) 50 mg/5 gram (1 %) gel Testim 50 mg/5 gram (1 %) transdermal gel           ALLERGIES:  No Known Allergies      REVIEW OF SYSTEMS:  10 point ROS reviewed with patient. Please see scanned document under media. PHYSICAL EXAM:  Vital Signs:   Visit Vitals  Resp 18   Ht 6' (1.829 m)   Wt 94.8 kg (209 lb)   BMI 28.35 kg/m²        Due to this being a TeleHealth evaluation, many elements of the physical examination are unable to be assessed. Exam:  NEUROLOGICAL EXAM:  General: Awake, alert, speech fluent  CN: EOMI, VF intact, facial strength/sensation normal and symmetric, hearing is intact  Motor: AG x 4, no evidence of muscle atrophy  Reflexes: deferred  Coordination: No ataxia. Sensation: LT intact throughout  Gait: Steady      ASSESSMENT:      ICD-10-CM ICD-9-CM    1. Muscle stiffness M62.89 728.9 CK      ALDOLASE      METABOLIC PANEL, COMPREHENSIVE      MAGNESIUM      VITAMIN B12      EMG LIMITED   2. Myalgia M79.10 729.1 CK      ALDOLASE      METABOLIC PANEL, COMPREHENSIVE      MAGNESIUM      VITAMIN B12      EMG LIMITED   62year old male presenting with episodic tightening of the upper chest and b/l upper extremity muscles x 1-2 years without associated muscle weakness/atrophy. Will proceed with laboratory investigations to assess for any contributing metabolic derangements as well as evidence of hyperCKemia which may suggest underlying myopathy. Electrodiagnostic testing will also be pursued to assess for myopathy or neuromuscular junction disorder. Follow up will be arranged if necessary after completion of testing. PLAN:  · CK, Aldolase, CMP, Mg, B12  · EMG Myopathy protocol     I have discussed the diagnosis with the patient and the intended plan as seen in the above orders. Patient is in agreement. Lukasz Mcginnis DO  Staff Neurologist  Diplomate, American Board of Psychiatry & Neurology       CC Referring provider:  Bonita Powers MD

## 2020-12-11 ENCOUNTER — OFFICE VISIT (OUTPATIENT)
Dept: INTERNAL MEDICINE CLINIC | Age: 60
End: 2020-12-11
Payer: OTHER GOVERNMENT

## 2020-12-11 VITALS
HEIGHT: 72 IN | SYSTOLIC BLOOD PRESSURE: 120 MMHG | WEIGHT: 215 LBS | DIASTOLIC BLOOD PRESSURE: 88 MMHG | BODY MASS INDEX: 29.12 KG/M2 | OXYGEN SATURATION: 97 % | RESPIRATION RATE: 16 BRPM | HEART RATE: 71 BPM | TEMPERATURE: 97.2 F

## 2020-12-11 DIAGNOSIS — Z12.5 PROSTATE CANCER SCREENING: ICD-10-CM

## 2020-12-11 DIAGNOSIS — F51.01 PRIMARY INSOMNIA: ICD-10-CM

## 2020-12-11 DIAGNOSIS — R79.89 LOW VITAMIN D LEVEL: ICD-10-CM

## 2020-12-11 DIAGNOSIS — M17.11 ARTHRITIS OF RIGHT KNEE: ICD-10-CM

## 2020-12-11 DIAGNOSIS — Z13.1 DIABETES MELLITUS SCREENING: ICD-10-CM

## 2020-12-11 DIAGNOSIS — F41.9 ANXIETY: ICD-10-CM

## 2020-12-11 DIAGNOSIS — Z00.00 WELL ADULT EXAM: Primary | ICD-10-CM

## 2020-12-11 DIAGNOSIS — N40.0 BENIGN PROSTATIC HYPERPLASIA, UNSPECIFIED WHETHER LOWER URINARY TRACT SYMPTOMS PRESENT: ICD-10-CM

## 2020-12-11 LAB
25(OH)D3 SERPL-MCNC: 50.6 NG/ML (ref 30–100)
ALBUMIN SERPL-MCNC: 4.5 G/DL (ref 3.5–5)
ALBUMIN/GLOB SERPL: 1.6 {RATIO} (ref 1.1–2.2)
ALP SERPL-CCNC: 83 U/L (ref 45–117)
ALT SERPL-CCNC: 61 U/L (ref 12–78)
ANION GAP SERPL CALC-SCNC: 3 MMOL/L (ref 5–15)
AST SERPL-CCNC: 34 U/L (ref 15–37)
BASOPHILS # BLD: 0 K/UL (ref 0–0.1)
BASOPHILS NFR BLD: 0 % (ref 0–1)
BILIRUB SERPL-MCNC: 1 MG/DL (ref 0.2–1)
BUN SERPL-MCNC: 23 MG/DL (ref 6–20)
BUN/CREAT SERPL: 20 (ref 12–20)
CALCIUM SERPL-MCNC: 9.6 MG/DL (ref 8.5–10.1)
CHLORIDE SERPL-SCNC: 104 MMOL/L (ref 97–108)
CHOLEST SERPL-MCNC: 223 MG/DL
CO2 SERPL-SCNC: 31 MMOL/L (ref 21–32)
CREAT SERPL-MCNC: 1.13 MG/DL (ref 0.7–1.3)
DIFFERENTIAL METHOD BLD: NORMAL
EOSINOPHIL # BLD: 0.1 K/UL (ref 0–0.4)
EOSINOPHIL NFR BLD: 2 % (ref 0–7)
ERYTHROCYTE [DISTWIDTH] IN BLOOD BY AUTOMATED COUNT: 11.6 % (ref 11.5–14.5)
EST. AVERAGE GLUCOSE BLD GHB EST-MCNC: 105 MG/DL
GLOBULIN SER CALC-MCNC: 2.9 G/DL (ref 2–4)
GLUCOSE SERPL-MCNC: 91 MG/DL (ref 65–100)
HBA1C MFR BLD: 5.3 % (ref 4–5.6)
HCT VFR BLD AUTO: 46.2 % (ref 36.6–50.3)
HDLC SERPL-MCNC: 66 MG/DL
HDLC SERPL: 3.4 {RATIO} (ref 0–5)
HGB BLD-MCNC: 15.2 G/DL (ref 12.1–17)
IMM GRANULOCYTES # BLD AUTO: 0 K/UL (ref 0–0.04)
IMM GRANULOCYTES NFR BLD AUTO: 0 % (ref 0–0.5)
LDLC SERPL CALC-MCNC: 118.8 MG/DL (ref 0–100)
LIPID PROFILE,FLP: ABNORMAL
LYMPHOCYTES # BLD: 2.1 K/UL (ref 0.8–3.5)
LYMPHOCYTES NFR BLD: 31 % (ref 12–49)
MCH RBC QN AUTO: 31.1 PG (ref 26–34)
MCHC RBC AUTO-ENTMCNC: 32.9 G/DL (ref 30–36.5)
MCV RBC AUTO: 94.7 FL (ref 80–99)
MONOCYTES # BLD: 0.5 K/UL (ref 0–1)
MONOCYTES NFR BLD: 8 % (ref 5–13)
NEUTS SEG # BLD: 4 K/UL (ref 1.8–8)
NEUTS SEG NFR BLD: 59 % (ref 32–75)
NRBC # BLD: 0 K/UL (ref 0–0.01)
NRBC BLD-RTO: 0 PER 100 WBC
PLATELET # BLD AUTO: 238 K/UL (ref 150–400)
PMV BLD AUTO: 10.8 FL (ref 8.9–12.9)
POTASSIUM SERPL-SCNC: 5.4 MMOL/L (ref 3.5–5.1)
PROT SERPL-MCNC: 7.4 G/DL (ref 6.4–8.2)
RBC # BLD AUTO: 4.88 M/UL (ref 4.1–5.7)
SODIUM SERPL-SCNC: 138 MMOL/L (ref 136–145)
TRIGL SERPL-MCNC: 191 MG/DL (ref ?–150)
TSH SERPL DL<=0.05 MIU/L-ACNC: 1.49 UIU/ML (ref 0.36–3.74)
VLDLC SERPL CALC-MCNC: 38.2 MG/DL
WBC # BLD AUTO: 6.7 K/UL (ref 4.1–11.1)

## 2020-12-11 PROCEDURE — 99396 PREV VISIT EST AGE 40-64: CPT | Performed by: INTERNAL MEDICINE

## 2020-12-11 RX ORDER — FINASTERIDE 5 MG/1
2.5 TABLET, FILM COATED ORAL DAILY
Qty: 60 TAB | Refills: 3 | Status: SHIPPED | OUTPATIENT
Start: 2020-12-11

## 2020-12-11 RX ORDER — ZOLPIDEM TARTRATE 5 MG/1
5 TABLET ORAL
Qty: 90 TAB | Refills: 1 | Status: SHIPPED | OUTPATIENT
Start: 2020-12-11 | End: 2021-12-14 | Stop reason: SDUPTHER

## 2020-12-11 RX ORDER — ALPRAZOLAM 0.5 MG/1
0.5 TABLET ORAL
Qty: 30 TAB | Refills: 1 | Status: SHIPPED | OUTPATIENT
Start: 2020-12-11 | End: 2021-06-30 | Stop reason: SDUPTHER

## 2020-12-11 NOTE — PROGRESS NOTES
Chief Complaint   Patient presents with    Complete Physical     Reviewed record in preparation for visit and have obtained necessary documentation. Identified pt with two pt identifiers(name and ). There are no preventive care reminders to display for this patient. Chief Complaint   Patient presents with    Complete Physical        Wt Readings from Last 3 Encounters:   20 215 lb (97.5 kg)   20 209 lb (94.8 kg)   19 209 lb 3.2 oz (94.9 kg)     Temp Readings from Last 3 Encounters:   20 97.2 °F (36.2 °C)   19 98.4 °F (36.9 °C) (Oral)   19 98.5 °F (36.9 °C) (Oral)     BP Readings from Last 3 Encounters:   20 120/88   19 121/90   19 120/79     Pulse Readings from Last 3 Encounters:   20 71   19 67   19 (!) 55           Learning Assessment:  :     Learning Assessment 2018   PRIMARY LEARNER Patient   HIGHEST LEVEL OF EDUCATION - PRIMARY LEARNER  > 4 YEARS OF COLLEGE   BARRIERS PRIMARY LEARNER NONE   PRIMARY LANGUAGE ENGLISH   LEARNER PREFERENCE PRIMARY LISTENING   ANSWERED BY pt   RELATIONSHIP SELF       Depression Screening:  :     3 most recent PHQ Screens 2019   Little interest or pleasure in doing things Not at all   Feeling down, depressed, irritable, or hopeless Not at all   Total Score PHQ 2 0       Fall Risk Assessment:  :     Fall Risk Assessment, last 12 mths 2018   Able to walk? Yes   Fall in past 12 months? Yes   Fall with injury? No   Number of falls in past 12 months 1   Fall Risk Score 1       Abuse Screening:  :     Abuse Screening Questionnaire 2018   Do you ever feel afraid of your partner? N   Are you in a relationship with someone who physically or mentally threatens you? N   Is it safe for you to go home?  Y       Coordination of Care Questionnaire:  :     1) Have you been to an emergency room, urgent care clinic since your last visit? no   Hospitalized since your last visit? no 2) Have you seen or consulted any other health care providers outside of [address] [place] [address] since your last visit? no  (Include any pap smears or colon screenings in this section.)    3) Do you have an Advance Directive on file? no    4) Are you interested in receiving information on Advance Directives? NO      Patient is accompanied by self I have received verbal consent from [facility] to discuss any/all medical information while they are present in the room. Reviewed record  In preparation for visit and have obtained necessary documentation.

## 2020-12-11 NOTE — PROGRESS NOTES
Comprehensive Physical Examination    Rivera Higgins is a 56 y.o. male. he presents for a comprehensive physical examination. The patient reports feeling well. No acute complaints. He reports that he has not exercised as much during the past few months. He has gained some weight but is now working on improving his weight. He remains on medications, no issues with these. Reviewed health maintenance items. Labs needed. Ordered today. Patient Active Problem List    Diagnosis Date Noted    Hair loss 09/26/2019    Acne 09/26/2019    Benign prostatic hyperplasia 09/26/2019     Current Outpatient Medications   Medication Sig Dispense Refill    zolpidem (AMBIEN) 5 mg tablet Take 1 Tab by mouth nightly as needed for Sleep. Max Daily Amount: 5 mg. 90 Tab 1    tretinoin (AVITA) 0.025 % topical cream Avita 0.025 % topical cream 20 g 3    finasteride (PROSCAR) 5 mg tablet Take 0.5 Tabs by mouth daily. 60 Tab 3    tadalafil (CIALIS) 10 mg tablet Cialis 10 mg tablet  Indications: Inability to have an Erection 30 Tab 3    glucosamine/chondro lee A/C/Mn (GLUCOSAMINE-CHONDROITIN COMPLX PO) 1 daily      cholecalciferol (VITAMIN D3) 1,000 unit tablet Vitamin D3 1,000 unit tablet      saw palmetto xtr/zinc picolin (SAW PALMETTO EXTRACT PO) Take  by mouth.  cpap machine kit by Does Not Apply route.  testosterone (TESTIM) 50 mg/5 gram (1 %) gel Testim 50 mg/5 gram (1 %) transdermal gel      ALPRAZolam (XANAX) 0.5 mg tablet Take 1 Tab by mouth nightly as needed for Anxiety. Max Daily Amount: 0.5 mg. 30 Tab 1    propranolol (INDERAL) 20 mg tablet Take 1 Tab by mouth daily. 90 Tab 1    glucosamine-chondroitin (ARTHX) 500-400 mg cap Take 1 Cap by mouth daily. No Known Allergies  No past medical history on file.   Past Surgical History:   Procedure Laterality Date    HX CATARACT REMOVAL Bilateral 11/2019    HX HERNIA REPAIR      HX VASECTOMY      HX VEIN STRIPPING      KNEE SCOPE, Vainupea 50 TRANSPLANT Bilateral      Family History   Problem Relation Age of Onset    Heart Disease Mother     Hypertension Mother     Cancer Father      Social History     Tobacco Use    Smoking status: Never Smoker    Smokeless tobacco: Never Used   Substance Use Topics    Alcohol use: Yes     Alcohol/week: 7.0 standard drinks     Types: 7 Standard drinks or equivalent per week        Health Maintenance   Topic Date Due    Colorectal Cancer Screening Combo  11/01/2021    Lipid Screen  11/04/2024    DTaP/Tdap/Td series (2 - Td) 10/15/2028    Hepatitis C Screening  Completed    Shingrix Vaccine Age 50>  Completed    Flu Vaccine  Completed    Pneumococcal 0-64 years  Aged Out         ROS      Visit Vitals  /88 (BP 1 Location: Left arm, BP Patient Position: Sitting)   Pulse 71   Temp 97.2 °F (36.2 °C)   Resp 16   Ht 6' (1.829 m)   Wt 215 lb (97.5 kg)   SpO2 97%   BMI 29.16 kg/m²       Physical Exam  Constitutional:       Appearance: Normal appearance. Cardiovascular:      Rate and Rhythm: Normal rate and regular rhythm. Pulses: Normal pulses. Heart sounds: Normal heart sounds. Pulmonary:      Breath sounds: Normal breath sounds. Neurological:      Mental Status: He is alert. ASSESSMENT/PLAN  Diagnoses and all orders for this visit:    1. Well adult exam    2. Anxiety  -     ALPRAZolam (XANAX) 0.5 mg tablet; Take 1 Tab by mouth nightly as needed for Anxiety. Max Daily Amount: 0.5 mg.    3. Primary insomnia  -     zolpidem (AMBIEN) 5 mg tablet; Take 1 Tab by mouth nightly as needed for Sleep. Max Daily Amount: 5 mg. 4. Low vitamin D level  -     VITAMIN D, 25 HYDROXY; Future    5. Diabetes mellitus screening  -     HEMOGLOBIN A1C WITH EAG; Future  -     TSH 3RD GENERATION; Future  -     LIPID PANEL; Future  -     CBC WITH AUTOMATED DIFF; Future  -     METABOLIC PANEL, COMPREHENSIVE; Future    6. Prostate cancer screening    7. Arthritis of right knee    8.  Benign prostatic hyperplasia, unspecified whether lower urinary tract symptoms present  -     finasteride (PROSCAR) 5 mg tablet; Take 0.5 Tabs by mouth daily.

## 2021-06-30 ENCOUNTER — TELEPHONE (OUTPATIENT)
Dept: INTERNAL MEDICINE CLINIC | Age: 61
End: 2021-06-30

## 2021-06-30 DIAGNOSIS — F41.9 ANXIETY: ICD-10-CM

## 2021-06-30 NOTE — TELEPHONE ENCOUNTER
Requested Prescriptions     Pending Prescriptions Disp Refills    ALPRAZolam (XANAX) 0.5 mg tablet 30 Tablet 1     Sig: Take 1 Tablet by mouth nightly as needed for Anxiety. Max Daily Amount: 0.5 mg.        [address], [place] - [facility]  [phone]

## 2021-07-01 RX ORDER — ALPRAZOLAM 0.5 MG/1
0.5 TABLET ORAL
Qty: 30 TABLET | Refills: 1 | Status: SHIPPED | OUTPATIENT
Start: 2021-07-01 | End: 2021-12-14 | Stop reason: SDUPTHER

## 2021-10-22 VITALS — WEIGHT: 210 LBS | BODY MASS INDEX: 26.95 KG/M2 | HEIGHT: 74 IN

## 2021-10-22 PROBLEM — G89.29 CHRONIC PAIN OF LEFT KNEE: Status: ACTIVE | Noted: 2021-10-22

## 2021-10-22 PROBLEM — M25.561 CHRONIC PAIN OF RIGHT KNEE: Status: ACTIVE | Noted: 2021-10-22

## 2021-10-22 PROBLEM — Z78.9 WEIGHT ABOVE 97TH PERCENTILE: Status: ACTIVE | Noted: 2021-10-22

## 2021-10-22 PROBLEM — G89.29 CHRONIC PAIN OF RIGHT KNEE: Status: ACTIVE | Noted: 2021-10-22

## 2021-10-22 PROBLEM — M17.12 PRIMARY OSTEOARTHRITIS OF LEFT KNEE: Status: ACTIVE | Noted: 2021-10-22

## 2021-10-22 PROBLEM — M25.562 CHRONIC PAIN OF LEFT KNEE: Status: ACTIVE | Noted: 2021-10-22

## 2021-10-22 PROBLEM — M17.11 PRIMARY OSTEOARTHRITIS OF RIGHT KNEE: Status: ACTIVE | Noted: 2021-10-22

## 2021-11-15 ENCOUNTER — OFFICE VISIT (OUTPATIENT)
Dept: ORTHOPEDIC SURGERY | Age: 61
End: 2021-11-15
Payer: OTHER GOVERNMENT

## 2021-11-15 VITALS — WEIGHT: 210 LBS | HEIGHT: 74 IN | BODY MASS INDEX: 26.95 KG/M2

## 2021-11-15 DIAGNOSIS — M25.461 KNEE EFFUSION, RIGHT: ICD-10-CM

## 2021-11-15 DIAGNOSIS — M17.12 PRIMARY LOCALIZED OSTEOARTHRITIS OF LEFT KNEE: ICD-10-CM

## 2021-11-15 DIAGNOSIS — M25.562 CHRONIC PAIN OF LEFT KNEE: Primary | ICD-10-CM

## 2021-11-15 DIAGNOSIS — M25.561 CHRONIC PAIN OF RIGHT KNEE: ICD-10-CM

## 2021-11-15 DIAGNOSIS — G89.29 CHRONIC PAIN OF LEFT KNEE: Primary | ICD-10-CM

## 2021-11-15 DIAGNOSIS — M17.11 PRIMARY LOCALIZED OSTEOARTHRITIS OF RIGHT KNEE: ICD-10-CM

## 2021-11-15 DIAGNOSIS — M24.561 FLEXION CONTRACTURE OF RIGHT KNEE: ICD-10-CM

## 2021-11-15 DIAGNOSIS — M24.562 FLEXION CONTRACTURE OF LEFT KNEE: ICD-10-CM

## 2021-11-15 DIAGNOSIS — G89.29 CHRONIC PAIN OF RIGHT KNEE: ICD-10-CM

## 2021-11-15 PROCEDURE — 99203 OFFICE O/P NEW LOW 30 MIN: CPT | Performed by: ORTHOPAEDIC SURGERY

## 2021-11-15 PROCEDURE — 20610 DRAIN/INJ JOINT/BURSA W/O US: CPT | Performed by: ORTHOPAEDIC SURGERY

## 2021-11-15 RX ORDER — BUPIVACAINE HYDROCHLORIDE 2.5 MG/ML
6 INJECTION, SOLUTION INFILTRATION; PERINEURAL ONCE
Status: COMPLETED | OUTPATIENT
Start: 2021-11-15 | End: 2021-11-15

## 2021-11-15 RX ORDER — METHYLPREDNISOLONE ACETATE 40 MG/ML
80 INJECTION, SUSPENSION INTRA-ARTICULAR; INTRALESIONAL; INTRAMUSCULAR; SOFT TISSUE ONCE
Status: COMPLETED | OUTPATIENT
Start: 2021-11-15 | End: 2021-11-15

## 2021-11-15 RX ADMIN — BUPIVACAINE HYDROCHLORIDE 15 MG: 2.5 INJECTION, SOLUTION INFILTRATION; PERINEURAL at 13:15

## 2021-11-15 RX ADMIN — METHYLPREDNISOLONE ACETATE 80 MG: 40 INJECTION, SUSPENSION INTRA-ARTICULAR; INTRALESIONAL; INTRAMUSCULAR; SOFT TISSUE at 13:16

## 2021-11-15 RX ADMIN — BUPIVACAINE HYDROCHLORIDE 6 ML: 2.5 INJECTION, SOLUTION INFILTRATION; PERINEURAL at 13:05

## 2021-11-15 RX ADMIN — METHYLPREDNISOLONE ACETATE 80 MG: 40 INJECTION, SUSPENSION INTRA-ARTICULAR; INTRALESIONAL; INTRAMUSCULAR; SOFT TISSUE at 13:06

## 2021-11-15 NOTE — PROGRESS NOTES
Gagandeep Cassidy (: 1960) is a 56 y.o. male, patient, here for evaluation of the following chief complaint(s):  Knee Pain (bilateral)       HPI:    He began having increased knee pain 20 years ago. The patient reports no specific injury. The patient describes his knee pain as sharp and constant. He has been experiencing some swelling in his knees. The patient reports that standing, walking, lifting, exercise, and twisting make his pain worse and rest makes his pain better. He does report previous knee surgery last December. No Known Allergies    Current Outpatient Medications   Medication Sig    ALPRAZolam (XANAX) 0.5 mg tablet Take 1 Tablet by mouth nightly as needed for Anxiety. Max Daily Amount: 0.5 mg.    finasteride (PROSCAR) 5 mg tablet Take 0.5 Tabs by mouth daily.  zolpidem (AMBIEN) 5 mg tablet Take 1 Tab by mouth nightly as needed for Sleep. Max Daily Amount: 5 mg.  propranolol (INDERAL) 20 mg tablet Take 1 Tab by mouth daily.  tretinoin (AVITA) 0.025 % topical cream Avita 0.025 % topical cream    tadalafil (CIALIS) 10 mg tablet Cialis 10 mg tablet  Indications: Inability to have an Erection    glucosamine/chondro lee A/C/Mn (GLUCOSAMINE-CHONDROITIN COMPLX PO) 1 daily    cholecalciferol (VITAMIN D3) 1,000 unit tablet Vitamin D3 1,000 unit tablet    glucosamine-chondroitin (ARTHX) 500-400 mg cap Take 1 Cap by mouth daily.  saw palmetto xtr/zinc picolin (SAW PALMETTO EXTRACT PO) Take  by mouth.  cpap machine kit by Does Not Apply route.  testosterone (TESTIM) 50 mg/5 gram (1 %) gel Testim 50 mg/5 gram (1 %) transdermal gel     No current facility-administered medications for this visit.        Past Medical History:   Diagnosis Date    Arthritis         Past Surgical History:   Procedure Laterality Date    HX CATARACT REMOVAL Bilateral 2019    HX HERNIA REPAIR      HX ORTHOPAEDIC      HX VASECTOMY      HX VEIN STRIPPING      [place] KNEE SCOPE, MENISC TRANSPLANT Bilateral Family History   Problem Relation Age of Onset    Heart Disease Mother     Hypertension Mother     Cancer Father         Social History     Socioeconomic History    Marital status:      Spouse name: Not on file    Number of children: Not on file    Years of education: Not on file    Highest education level: Not on file   Occupational History    Not on file   Tobacco Use    Smoking status: Never Smoker    Smokeless tobacco: Never Used   Substance and Sexual Activity    Alcohol use: Yes     Alcohol/week: 7.0 standard drinks     Types: 7 Standard drinks or equivalent per week    Drug use: No    Sexual activity: Yes     Partners: Female   Other Topics Concern    Not on file   Social History Narrative    Not on file     Social Determinants of Health     Financial Resource Strain:     Difficulty of Paying Living Expenses: Not on file   Food Insecurity:     Worried About Running Out of Food in the Last Year: Not on file    Sophy of Food in the Last Year: Not on file   Transportation Needs:     Lack of Transportation (Medical): Not on file    Lack of Transportation (Non-Medical):  Not on file   Physical Activity:     Days of Exercise per Week: Not on file    Minutes of Exercise per Session: Not on file   Stress:     Feeling of Stress : Not on file   Social Connections:     Frequency of Communication with Friends and Family: Not on file    Frequency of Social Gatherings with Friends and Family: Not on file    Attends [employer] Services: Not on file    Active Member of Clubs or Organizations: Not on file    Attends Club or Organization Meetings: Not on file    Marital Status: Not on file   Intimate Partner Violence:     Fear of Current or Ex-Partner: Not on file    Emotionally Abused: Not on file    Physically Abused: Not on file    Sexually Abused: Not on file   Housing Stability:     Unable to Pay for Housing in the Last Year: Not on file    Number of [place] in the Last Year: Not on file    Unstable Housing in the Last Year: Not on file       Review of Systems   All other systems reviewed and are negative. Vitals:  Ht 6' 2\" (1.88 m)   Wt 210 lb (95.3 kg)   BMI 26.96 kg/m²    Body mass index is 26.96 kg/m². Ortho Exam     The patient is well-developed and well-nourished. The patient presents today in alert and oriented x3 with a normal mood and affect. The patient stands with a normal weightbearing line walks with a slightly antalgic gait because of his bilateral knee pain. Right knee, the patient is tender to palpation along the medial joint line, and has a mild size effusion. They are nontender to palpation along the medial and lateral facets of the patella. The patient has no discomfort with Lucy's maneuvers, and the knee is stable. They have limited range of motion. He has a 22 degree flexion contracture. He has approximately 110 degrees of flexion. They have 5/5 strength, and are neurovascularly intact distally. There is no erythema, warmth or skin lesions present. Left knee, the patient is tender to palpation along the medial joint line, and has no effusion. They are nontender to palpation along the medial and lateral facets of the patella. The patient has no discomfort with Lucy's maneuvers, and the knee is stable. They have limited range of motion. He has a 20 degree flexion contracture. He has approximately 110 degrees of flexion. They have 5/5 strength, and are neurovascularly intact distally. There is no erythema, warmth or skin lesions present. ASSESSMENT/PLAN:      1. Chronic pain of left knee  -     XR KNEE RT MIN 4 V; Future  2. Primary localized osteoarthritis of left knee  3. Flexion contracture of left knee  4. Chronic pain of right knee  -     XR KNEE LT MIN 4 V; Future  5. Primary localized osteoarthritis of right knee  6. Flexion contracture of right knee  7.  Knee effusion, right     XR Results (most recent):  Results from Appointment encounter on 11/15/21    XR KNEE RT MIN 4 V    Narrative  Right knee 4 view x-rays show no evidence of a fracture or dislocation. Evidence of end-stage bone-on-bone osteoarthritis. XR KNEE LT MIN 4V     Left knee 4 view x-rays show no evidence of a fracture or dislocation. Evidence of advanced almost end-stage osteoarthritis. Below is the assessment and plan developed based on review of pertinent history, physical exam, labs, studies, and medications. We discussed the patient's ongoing bilateral knee pain, significant end-stage and almost end-stage medial compartment osteoarthritis, flexion contractures, and mild size right knee effusion. The possible treatment options were discussed with the patient and because of an effusion present we elected to aspirate his right knee and inject both of his knees with cortisone today to try and alleviate some of his discomfort. The risks and benefits of the aspiration and both injections were discussed in detail with the patient and under sterile prep the patient's right knee was aspirated of approximately 10 ccs of straw-colored synovial fluid and both of his knees were injected with 2 ccs of Depo-Medrol and 6 ccs of Marcaine. He tolerated the aspiration and both injections well. I did encourage him to continue to ice and elevate when possible, modify his activity level based on his bilateral knee pain, monitor his right knee effusion, and use anti-inflammatory medication when necessary. The patient will also work on range of motion, strengthening, and stretching exercises with an at-home exercise program as pain tolerates. He is to avoid any deep knee bend activities against resistance, squatting, kneeling, stairs, lunging, and high impact loading activities. I will see him back on an as-needed basis for his bilateral knee pain. Return if symptoms worsen or fail to improve.     An electronic signature was used to authenticate this note.  -- Lizzie Randle MD

## 2021-12-14 ENCOUNTER — OFFICE VISIT (OUTPATIENT)
Dept: INTERNAL MEDICINE CLINIC | Age: 61
End: 2021-12-14
Payer: OTHER GOVERNMENT

## 2021-12-14 VITALS
RESPIRATION RATE: 16 BRPM | HEART RATE: 66 BPM | BODY MASS INDEX: 27.21 KG/M2 | WEIGHT: 212 LBS | DIASTOLIC BLOOD PRESSURE: 75 MMHG | HEIGHT: 74 IN | OXYGEN SATURATION: 98 % | TEMPERATURE: 97.5 F | SYSTOLIC BLOOD PRESSURE: 125 MMHG

## 2021-12-14 DIAGNOSIS — Z00.00 WELL ADULT EXAM: Primary | ICD-10-CM

## 2021-12-14 DIAGNOSIS — F51.01 PRIMARY INSOMNIA: ICD-10-CM

## 2021-12-14 DIAGNOSIS — L70.9 ACNE, UNSPECIFIED ACNE TYPE: ICD-10-CM

## 2021-12-14 DIAGNOSIS — F41.9 ANXIETY: ICD-10-CM

## 2021-12-14 DIAGNOSIS — N52.8 OTHER MALE ERECTILE DYSFUNCTION: ICD-10-CM

## 2021-12-14 DIAGNOSIS — Z00.00 WELL ADULT EXAM: ICD-10-CM

## 2021-12-14 DIAGNOSIS — H92.01 RIGHT EAR PAIN: ICD-10-CM

## 2021-12-14 LAB
ALBUMIN SERPL-MCNC: 4.3 G/DL (ref 3.5–5)
ALBUMIN/GLOB SERPL: 1.5 {RATIO} (ref 1.1–2.2)
ALP SERPL-CCNC: 74 U/L (ref 45–117)
ALT SERPL-CCNC: 37 U/L (ref 12–78)
ANION GAP SERPL CALC-SCNC: 3 MMOL/L (ref 5–15)
AST SERPL-CCNC: 21 U/L (ref 15–37)
BASOPHILS # BLD: 0 K/UL (ref 0–0.1)
BASOPHILS NFR BLD: 1 % (ref 0–1)
BILIRUB SERPL-MCNC: 0.6 MG/DL (ref 0.2–1)
BUN SERPL-MCNC: 20 MG/DL (ref 6–20)
BUN/CREAT SERPL: 20 (ref 12–20)
CALCIUM SERPL-MCNC: 9.7 MG/DL (ref 8.5–10.1)
CHLORIDE SERPL-SCNC: 105 MMOL/L (ref 97–108)
CHOLEST SERPL-MCNC: 218 MG/DL
CO2 SERPL-SCNC: 30 MMOL/L (ref 21–32)
CREAT SERPL-MCNC: 1.02 MG/DL (ref 0.7–1.3)
DIFFERENTIAL METHOD BLD: NORMAL
EOSINOPHIL # BLD: 0.2 K/UL (ref 0–0.4)
EOSINOPHIL NFR BLD: 3 % (ref 0–7)
ERYTHROCYTE [DISTWIDTH] IN BLOOD BY AUTOMATED COUNT: 12.1 % (ref 11.5–14.5)
GLOBULIN SER CALC-MCNC: 2.8 G/DL (ref 2–4)
GLUCOSE SERPL-MCNC: 71 MG/DL (ref 65–100)
HCT VFR BLD AUTO: 43.7 % (ref 36.6–50.3)
HDLC SERPL-MCNC: 64 MG/DL
HDLC SERPL: 3.4 {RATIO} (ref 0–5)
HGB BLD-MCNC: 14 G/DL (ref 12.1–17)
IMM GRANULOCYTES # BLD AUTO: 0 K/UL (ref 0–0.04)
IMM GRANULOCYTES NFR BLD AUTO: 0 % (ref 0–0.5)
LDLC SERPL CALC-MCNC: 124.2 MG/DL (ref 0–100)
LYMPHOCYTES # BLD: 1.4 K/UL (ref 0.8–3.5)
LYMPHOCYTES NFR BLD: 29 % (ref 12–49)
MCH RBC QN AUTO: 31.3 PG (ref 26–34)
MCHC RBC AUTO-ENTMCNC: 32 G/DL (ref 30–36.5)
MCV RBC AUTO: 97.5 FL (ref 80–99)
MONOCYTES # BLD: 0.4 K/UL (ref 0–1)
MONOCYTES NFR BLD: 9 % (ref 5–13)
NEUTS SEG # BLD: 3 K/UL (ref 1.8–8)
NEUTS SEG NFR BLD: 58 % (ref 32–75)
NRBC # BLD: 0 K/UL (ref 0–0.01)
NRBC BLD-RTO: 0 PER 100 WBC
PLATELET # BLD AUTO: 225 K/UL (ref 150–400)
PMV BLD AUTO: 11.3 FL (ref 8.9–12.9)
POTASSIUM SERPL-SCNC: 4.6 MMOL/L (ref 3.5–5.1)
PROT SERPL-MCNC: 7.1 G/DL (ref 6.4–8.2)
PSA SERPL-MCNC: 0.4 NG/ML (ref 0.01–4)
RBC # BLD AUTO: 4.48 M/UL (ref 4.1–5.7)
SODIUM SERPL-SCNC: 138 MMOL/L (ref 136–145)
TRIGL SERPL-MCNC: 149 MG/DL (ref ?–150)
VLDLC SERPL CALC-MCNC: 29.8 MG/DL
WBC # BLD AUTO: 5 K/UL (ref 4.1–11.1)

## 2021-12-14 PROCEDURE — 99396 PREV VISIT EST AGE 40-64: CPT | Performed by: INTERNAL MEDICINE

## 2021-12-14 RX ORDER — TRETINOIN 0.25 MG/G
CREAM TOPICAL
Qty: 20 G | Refills: 3 | Status: SHIPPED | OUTPATIENT
Start: 2021-12-14

## 2021-12-14 RX ORDER — ALPRAZOLAM 0.5 MG/1
0.5 TABLET ORAL
Qty: 30 TABLET | Refills: 1 | Status: SHIPPED | OUTPATIENT
Start: 2021-12-14 | End: 2022-08-29 | Stop reason: SDUPTHER

## 2021-12-14 RX ORDER — ZOLPIDEM TARTRATE 5 MG/1
5 TABLET ORAL
Qty: 90 TABLET | Refills: 1 | Status: SHIPPED | OUTPATIENT
Start: 2021-12-14

## 2021-12-14 RX ORDER — TADALAFIL 10 MG/1
TABLET ORAL
Qty: 30 TABLET | Refills: 3 | Status: SHIPPED | OUTPATIENT
Start: 2021-12-14

## 2021-12-14 NOTE — PROGRESS NOTES
Verified name and birth date for privacy precautions. Chart reviewed in preparation for today's visit. Chief Complaint   Patient presents with    Complete Physical          Health Maintenance Due   Topic    Flu Vaccine (1)    Colorectal Cancer Screening Combo          Wt Readings from Last 3 Encounters:   12/14/21 212 lb (96.2 kg)   11/15/21 210 lb (95.3 kg)   10/22/21 210 lb (95.3 kg)     Temp Readings from Last 3 Encounters:   12/14/21 97.5 °F (36.4 °C) (Temporal)   12/11/20 97.2 °F (36.2 °C)   11/18/19 98.4 °F (36.9 °C) (Oral)     BP Readings from Last 3 Encounters:   12/14/21 125/75   12/11/20 120/88   11/18/19 121/90     Pulse Readings from Last 3 Encounters:   12/14/21 66   12/11/20 71   11/18/19 67         Learning Assessment:  :     Learning Assessment 8/28/2018   PRIMARY LEARNER Patient   HIGHEST LEVEL OF EDUCATION - PRIMARY LEARNER  > 4 YEARS OF COLLEGE   BARRIERS PRIMARY LEARNER NONE   PRIMARY LANGUAGE ENGLISH   LEARNER PREFERENCE PRIMARY LISTENING   ANSWERED BY pt   RELATIONSHIP SELF       Depression Screening:  :     3 most recent PHQ Screens 12/14/2021   Little interest or pleasure in doing things Not at all   Feeling down, depressed, irritable, or hopeless Not at all   Total Score PHQ 2 0       Fall Risk Assessment:  :     Fall Risk Assessment, last 12 mths 8/28/2018   Able to walk? Yes   Fall in past 12 months? Yes   Number of falls in past 12 months 1   Fall with injury? 0       Abuse Screening:  :     Abuse Screening Questionnaire 12/14/2021 8/28/2018   Do you ever feel afraid of your partner? N N   Are you in a relationship with someone who physically or mentally threatens you? N N   Is it safe for you to go home?  [place]

## 2021-12-14 NOTE — PROGRESS NOTES
Comprehensive Physical Examination    Genaro Tobar is a 59 y.o. male. he presents for a comprehensive physical examination. He has had right ear throbbing over the past 6 months. This has been associated with some hearing deficit and tinnitus. He denies recent trauma to the ear. We discussed further that he was in the [employer] and did have to qualify at a [employer] range regularly. 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 Discussed how these past exposures could contribute to his present hearing issues. He otherwise feels well, no complaints today. He needs refills of meds. Will order today. Patient Active Problem List    Diagnosis Date Noted    Chronic pain of left knee 10/22/2021    Chronic pain of right knee 10/22/2021    Primary osteoarthritis of right knee 10/22/2021    Primary osteoarthritis of left knee 10/22/2021    Weight above 97th percentile 10/22/2021    Hair loss 09/26/2019    Acne 09/26/2019    Benign prostatic hyperplasia 09/26/2019     Current Outpatient Medications   Medication Sig Dispense Refill    ALPRAZolam (XANAX) 0.5 mg tablet Take 1 Tablet by mouth nightly as needed for Anxiety. Max Daily Amount: 0.5 mg. 30 Tablet 1    finasteride (PROSCAR) 5 mg tablet Take 0.5 Tabs by mouth daily. (Patient taking differently: Take  by mouth daily. 0.25 of A  tab) 60 Tab 3    zolpidem (AMBIEN) 5 mg tablet Take 1 Tab by mouth nightly as needed for Sleep. Max Daily Amount: 5 mg. 90 Tab 1    propranolol (INDERAL) 20 mg tablet Take 1 Tab by mouth daily.  (Patient taking differently: Take 20 mg by mouth as needed.) 90 Tab 1    tretinoin (AVITA) 0.025 % topical cream Avita 0.025 % topical cream 20 g 3    tadalafil (CIALIS) 10 mg tablet Cialis 10 mg tablet  Indications: Inability to have an Erection 30 Tab 3    glucosamine/chondro lee A/C/Mn (GLUCOSAMINE-CHONDROITIN COMPLX PO) 1 daily      cholecalciferol (VITAMIN D3) 1,000 unit tablet Vitamin D3 1,000 unit tablet      saw palmetto xtr/zinc picolin (SAW PALMETTO EXTRACT PO) Take  by mouth.  cpap machine kit by Does Not Apply route.  testosterone (TESTIM) 50 mg/5 gram (1 %) gel Testim 50 mg/5 gram (1 %) transdermal gel       No Known Allergies  Past Medical History:   Diagnosis Date    Arthritis      Past Surgical History:   Procedure Laterality Date    HX CATARACT REMOVAL Bilateral 11/2019    HX HERNIA REPAIR      HX ORTHOPAEDIC      HX VASECTOMY      HX VEIN STRIPPING      [place] KNEE SCOPE, Vainupea 50 TRANSPLANT Bilateral      Family History   Problem Relation Age of Onset    Heart Disease Mother     Hypertension Mother     Cancer Father      Social History     Tobacco Use    Smoking status: Never Smoker    Smokeless tobacco: Never Used   Substance Use Topics    Alcohol use: Yes     Alcohol/week: 7.0 standard drinks     Types: 7 Standard drinks or equivalent per week        Health Maintenance   Topic Date Due    Flu Vaccine (1) 09/01/2021    Colorectal Cancer Screening Combo  11/01/2021    Lipid Screen  12/11/2025    DTaP/Tdap/Td series (2 - Td or Tdap) 10/15/2028    Hepatitis C Screening  Completed    Shingrix Vaccine Age 50>  Completed    COVID-19 Vaccine  Completed    Pneumococcal 0-64 years  Aged Out         Review of Systems   Constitutional: Negative. HENT: Positive for ear pain and tinnitus. Respiratory: Negative. Cardiovascular: Negative. Gastrointestinal: Negative. Visit Vitals  /75 (BP 1 Location: Left arm, BP Patient Position: Sitting, BP Cuff Size: Large adult)   Pulse 66   Temp 97.5 °F (36.4 °C) (Temporal)   Resp 16   Ht 6' 2\" (1.88 m)   Wt 212 lb (96.2 kg)   SpO2 98%   BMI 27.22 kg/m²       Physical Exam  Constitutional:       Appearance: Normal appearance.    HENT:      Right Ear: Tympanic membrane, ear canal and external ear normal.      Left Ear: Tympanic membrane, ear canal and external ear normal.   Cardiovascular:      Rate and Rhythm: Normal rate and regular rhythm. Pulses: Normal pulses. Heart sounds: Normal heart sounds. Neurological:      Mental Status: He is alert. ASSESSMENT/PLAN  Diagnoses and all orders for this visit:    1. Well adult exam  -     COLOGUARD TEST (FECAL DNA COLORECTAL CANCER SCREENING)  -     CBC WITH AUTOMATED DIFF; Future  -     PSA SCREENING (SCREENING); Future  -     METABOLIC PANEL, COMPREHENSIVE; Future  -     LIPID PANEL; Future    2. Right ear pain - Discussed with the patient that he may need to follow up with ENT given his ear discomfort and hearing issues. He will look into this at the [place]. 3. Primary insomnia  -     zolpidem (AMBIEN) 5 mg tablet; Take 1 Tablet by mouth nightly as needed for Sleep. Max Daily Amount: 5 mg. 4. Acne, unspecified acne type  -     tretinoin (Avita) 0.025 % topical cream; Avita 0.025 % topical cream    5. Other male erectile dysfunction  -     tadalafiL (Cialis) 10 mg tablet; Cialis 10 mg tablet  Indications: the inability to have an erection    6. Anxiety  -     ALPRAZolam (XANAX) 0.5 mg tablet; Take 1 Tablet by mouth nightly as needed for Anxiety.  Max Daily Amount: 0.5 mg.

## 2021-12-20 ENCOUNTER — PATIENT MESSAGE (OUTPATIENT)
Dept: INTERNAL MEDICINE CLINIC | Age: 61
End: 2021-12-20

## 2021-12-22 NOTE — TELEPHONE ENCOUNTER
----- Message from Adrian Powers MD sent at 12/21/2021  5:18 PM EST -----  Regarding: RE: [facility] Order Problem  Has anything come to us from [facility]?    ----- Message -----  From: Martita Moss LPN  Sent: [id number]   4:07 PM EST  To: Adrian Powers MD  Subject: Lourdes Poncerow: Cologuard Order Problem                        ----- Message -----  From: Genaro Tobar  Sent: 12/20/2021   2:19 PM EST  To: [facility]  Subject: Cologuard Order Problem                          Hello,  [facility] contacted me and said they didn't receive the ICD codes (for billing purposes) when Dr. Maren Gunn order was sent. Gail will be contacting the office directly but suggested I do as well. ICD codes 212.11 and 212.12 were missing from the order.   Thanks,  Jose Amaya

## 2022-02-20 NOTE — PROGRESS NOTES
Sherif Gan (: 1960) is a 56 y.o. male, patient, here for evaluation of the following chief complaint(s):  Knee Pain (bilateral)       HPI:    He began having increased knee pain 20 years ago. The patient reports no specific injury. The patient describes his knee pain as sharp and constant. He has been experiencing some swelling in his knees. The patient reports that standing, walking, lifting, exercise, and twisting make his pain worse and rest makes his pain better. He does report previous knee surgery last December. No Known Allergies    Current Outpatient Medications   Medication Sig    ALPRAZolam (XANAX) 0.5 mg tablet Take 1 Tablet by mouth nightly as needed for Anxiety. Max Daily Amount: 0.5 mg.    finasteride (PROSCAR) 5 mg tablet Take 0.5 Tabs by mouth daily.  zolpidem (AMBIEN) 5 mg tablet Take 1 Tab by mouth nightly as needed for Sleep. Max Daily Amount: 5 mg.  propranolol (INDERAL) 20 mg tablet Take 1 Tab by mouth daily.  tretinoin (AVITA) 0.025 % topical cream Avita 0.025 % topical cream    tadalafil (CIALIS) 10 mg tablet Cialis 10 mg tablet  Indications: Inability to have an Erection    glucosamine/chondro lee A/C/Mn (GLUCOSAMINE-CHONDROITIN COMPLX PO) 1 daily    cholecalciferol (VITAMIN D3) 1,000 unit tablet Vitamin D3 1,000 unit tablet    glucosamine-chondroitin (ARTHX) 500-400 mg cap Take 1 Cap by mouth daily.  saw palmetto xtr/zinc picolin (SAW PALMETTO EXTRACT PO) Take  by mouth.  cpap machine kit by Does Not Apply route.  testosterone (TESTIM) 50 mg/5 gram (1 %) gel Testim 50 mg/5 gram (1 %) transdermal gel     No current facility-administered medications for this visit.        Past Medical History:   Diagnosis Date    Arthritis         Past Surgical History:   Procedure Laterality Date    HX CATARACT REMOVAL Bilateral 2019    HX HERNIA REPAIR      HX ORTHOPAEDIC      HX VASECTOMY      HX VEIN STRIPPING      [place] KNEE SCOPE, MENISC TRANSPLANT Bilateral Family History   Problem Relation Age of Onset    Heart Disease Mother     Hypertension Mother     Cancer Father         Social History     Socioeconomic History    Marital status:      Spouse name: Not on file    Number of children: Not on file    Years of education: Not on file    Highest education level: Not on file   Occupational History    Not on file   Tobacco Use    Smoking status: Never Smoker    Smokeless tobacco: Never Used   Substance and Sexual Activity    Alcohol use: Yes     Alcohol/week: 7.0 standard drinks     Types: 7 Standard drinks or equivalent per week    Drug use: No    Sexual activity: Yes     Partners: Female   Other Topics Concern    Not on file   Social History Narrative    Not on file     Social Determinants of Health     Financial Resource Strain:     Difficulty of Paying Living Expenses: Not on file   Food Insecurity:     Worried About Running Out of Food in the Last Year: Not on file    Sophy of Food in the Last Year: Not on file   Transportation Needs:     Lack of Transportation (Medical): Not on file    Lack of Transportation (Non-Medical):  Not on file   Physical Activity:     Days of Exercise per Week: Not on file    Minutes of Exercise per Session: Not on file   Stress:     Feeling of Stress : Not on file   Social Connections:     Frequency of Communication with Friends and Family: Not on file    Frequency of Social Gatherings with Friends and Family: Not on file    Attends Scientologist Services: Not on file    Active Member of Clubs or Organizations: Not on file    Attends Club or Organization Meetings: Not on file    Marital Status: Not on file   Intimate Partner Violence:     Fear of Current or Ex-Partner: Not on file    Emotionally Abused: Not on file    Physically Abused: Not on file    Sexually Abused: Not on file   Housing Stability:     Unable to Pay for Housing in the Last Year: Not on file    Number of [place] in the Last Year: Not on file    Unstable Housing in the Last Year: Not on file       Review of Systems   All other systems reviewed and are negative. Vitals:  Ht 6' 2\" (1.88 m)   Wt 210 lb (95.3 kg)   BMI 26.96 kg/m²    Body mass index is 26.96 kg/m². Ortho Exam     The patient is well-developed and well-nourished. The patient presents today in alert and oriented x3 with a normal mood and affect. The patient stands with a normal weightbearing line walks with a slightly antalgic gait because of his bilateral knee pain. Right knee, the patient is tender to palpation along the medial joint line, and has a mild size effusion. They are nontender to palpation along the medial and lateral facets of the patella. The patient has no discomfort with Lucy's maneuvers, and the knee is stable. They have limited range of motion. He has a 22 degree flexion contracture. He has approximately 110 degrees of flexion. They have 5/5 strength, and are neurovascularly intact distally. There is no erythema, warmth or skin lesions present. Left knee, the patient is tender to palpation along the medial joint line, and has no effusion. They are nontender to palpation along the medial and lateral facets of the patella. The patient has no discomfort with Lucy's maneuvers, and the knee is stable. They have limited range of motion. He has a 20 degree flexion contracture. He has approximately 110 degrees of flexion. They have 5/5 strength, and are neurovascularly intact distally. There is no erythema, warmth or skin lesions present. XR Results (most recent):  Results from Appointment encounter on 11/15/21    XR KNEE RT MIN 4 V    Narrative  Right knee 4 view x-rays show no evidence of a fracture or dislocation. Evidence of end-stage bone-on-bone osteoarthritis. XR KNEE LT MIN 4V     Left knee 4 view x-rays show no evidence of a fracture or dislocation.   Evidence of advanced almost end-stage osteoarthritis. ASSESSMENT/PLAN:      1. Chronic pain of left knee  -     XR KNEE RT MIN 4 V; Future  2. Primary localized osteoarthritis of left knee  3. Flexion contracture of left knee  4. Chronic pain of right knee  -     XR KNEE LT MIN 4 V; Future  5. Primary localized osteoarthritis of right knee  6. Flexion contracture of right knee  7. Knee effusion, right    In discussion with the patient, we considered the numerus possible diagnoses that could be contributing to their present symptoms. We also deliberated on the extensive management options that must be considered to treat their current condition. We reviewed their accessible prior medical records, diagnostic tests, and current health and employment information. We considered how these symptoms were affecting the patient´s activities of daily living as well as employment and fitness activities. The patient had various questions regarding the possible risks, benefits, complications, morbidity and mortality regarding their diagnosis and treatment options. The patients´ comorbidities were considered, and I advocated that they consider maximizing lifestyle modification through nutrition and exercise to aid in addressing their symptoms. Shared decision making yielded an understanding to move forward with conservation treatment preferences. The patient expressed understanding that if conservative management fails to alleviate the present symptoms they will return to office for re-evaluation and consideration of additional diagnostic tests and potential surgical options. In the interim, we have recommended ice, elevation, and anti-inflammatory medications along with a physician directed home exercise program. We discussed the risks and common side effects of anti-inflammatory medications and instructed the patient to discontinue the medication and contact us if they experienced any side effects.  The patient was encouraged to discuss the possible side effects with their family physician or pharmacist prior to initiating any new medications. We discussed the possibility of an injection of a steroid mixed with a local anesthetic to relieve pain and decrease inflammation in the right knee. The risks of a steroid injection which include but are not limited to cartilage damage, death of nearby bone, joint infection, nerve damage, temporary facial flushing, temporary steroid flare of pain and inflammation in the joint, temporary increase in blood sugar, tendon weakening or rupture, thinning of nearby bone (osteoporosis), thinning of skin and soft tissue around the injection site, and whitening or lightening of the skin around the injection site were reviewed at length. We specifically advised that patients with diabetes talk to their primary care to ensure they are safe for a steroid injection due to the transient increase in blood sugar associated with the injection. We discussed the chance of increased bleeding and bruising if the patient is on blood thinners or certain dietary supplements that have a blood-thinning effect. We advised patients that have an active infection, history of allergic reactions to steroids or take medications that may prohibit them from receiving a steroid injection to talk to their primary care physician before receiving and injection. We also talked about limiting the number of injections because these potential side effects increase with larger doses and repeated use. After explaining the risks and benefits of the procedure and obtaining verbal informed consent from the patient, the proposed area for injection was confirmed with the patient. After all questions and concerns were addressed, the skin was prepped with alcohol to reduce the chances of infection.  The skin was anesthetized with a topical ethylene chloride spray and a mixture of two milliliters of Depo-Medrol (40mg/ml), one milliliter of Lidocaine and one milliliter of Marcaine was injected slowly into the intra-articular space of right knee in a sterile fashion without difficulty. The needle was removed and disposed of in a sterile container. The patient tolerated the injection well and a band-aid was placed on the skin. The patient was counseled on protecting the injection area, avoiding water submersion for 2 days, icing for pain relief and looking for signs and symptoms of infection. We requested that the patient contact us if any symptoms persist greater than 48 hours after the injection. We discussed the possibility of an injection of a steroid mixed with a local anesthetic to relieve pain and decrease inflammation in the left knee. The risks of a steroid injection which include but are not limited to cartilage damage, death of nearby bone, joint infection, nerve damage, temporary facial flushing, temporary steroid flare of pain and inflammation in the joint, temporary increase in blood sugar, tendon weakening or rupture, thinning of nearby bone (osteoporosis), thinning of skin and soft tissue around the injection site, and whitening or lightening of the skin around the injection site were reviewed at length. We specifically advised that patients with diabetes talk to their primary care to ensure they are safe for a steroid injection due to the transient increase in blood sugar associated with the injection. We discussed the chance of increased bleeding and bruising if the patient is on blood thinners or certain dietary supplements that have a blood-thinning effect. We advised patients that have an active infection, history of allergic reactions to steroids or take medications that may prohibit them from receiving a steroid injection to talk to their primary care physician before receiving and injection. We also talked about limiting the number of injections because these potential side effects increase with larger doses and repeated use.     After explaining the risks and benefits of the procedure and obtaining verbal informed consent from the patient, the proposed area for injection was confirmed with the patient. After all questions and concerns were addressed, the skin was prepped with alcohol to reduce the chances of infection. The skin was anesthetized with a topical ethylene chloride spray and a mixture of two milliliters of Depo-Medrol (40mg/ml), one milliliter of Lidocaine and one milliliter of Marcaine was injected slowly into the intra-articular space of left knee in a sterile fashion without difficulty. The needle was removed and disposed of in a sterile container. The patient tolerated the injection well and a band-aid was placed on the skin. The patient was counseled on protecting the injection area, avoiding water submersion for 2 days, icing for pain relief and looking for signs and symptoms of infection. We requested that the patient contact us if any symptoms persist greater than 48 hours after the injection. After a long discussion regarding treatment options, we have elected to refer the patient to one of our knee replacement specialists for more comprehensive care of their arthritis.

## 2022-02-21 ENCOUNTER — OFFICE VISIT (OUTPATIENT)
Dept: ORTHOPEDIC SURGERY | Age: 62
End: 2022-02-21
Payer: OTHER GOVERNMENT

## 2022-02-21 VITALS — BODY MASS INDEX: 27.21 KG/M2 | WEIGHT: 212 LBS | HEIGHT: 74 IN

## 2022-02-21 DIAGNOSIS — M17.11 PRIMARY OSTEOARTHRITIS OF RIGHT KNEE: Primary | ICD-10-CM

## 2022-02-21 DIAGNOSIS — M17.12 PRIMARY OSTEOARTHRITIS OF LEFT KNEE: ICD-10-CM

## 2022-02-21 PROCEDURE — 20610 DRAIN/INJ JOINT/BURSA W/O US: CPT | Performed by: ORTHOPAEDIC SURGERY

## 2022-02-21 RX ORDER — TRIAMCINOLONE ACETONIDE 40 MG/ML
40 INJECTION, SUSPENSION INTRA-ARTICULAR; INTRAMUSCULAR ONCE
Status: COMPLETED | OUTPATIENT
Start: 2022-02-21 | End: 2022-02-21

## 2022-02-21 RX ORDER — BUPIVACAINE HYDROCHLORIDE 5 MG/ML
2 INJECTION, SOLUTION EPIDURAL; INTRACAUDAL ONCE
Status: COMPLETED | OUTPATIENT
Start: 2022-02-21 | End: 2022-02-21

## 2022-02-21 RX ORDER — TESTOSTERONE 10 MG/.5G
GEL, METERED TOPICAL DAILY
COMMUNITY
Start: 2021-12-21

## 2022-02-21 RX ADMIN — BUPIVACAINE HYDROCHLORIDE 10 MG: 5 INJECTION, SOLUTION EPIDURAL; INTRACAUDAL at 09:49

## 2022-02-21 RX ADMIN — TRIAMCINOLONE ACETONIDE 40 MG: 40 INJECTION, SUSPENSION INTRA-ARTICULAR; INTRAMUSCULAR at 09:49

## 2022-03-18 PROBLEM — M17.12 PRIMARY OSTEOARTHRITIS OF LEFT KNEE: Status: ACTIVE | Noted: 2021-10-22

## 2022-03-18 PROBLEM — M17.11 PRIMARY OSTEOARTHRITIS OF RIGHT KNEE: Status: ACTIVE | Noted: 2021-10-22

## 2022-03-19 PROBLEM — M25.562 CHRONIC PAIN OF LEFT KNEE: Status: ACTIVE | Noted: 2021-10-22

## 2022-03-19 PROBLEM — Z78.9 WEIGHT ABOVE 97TH PERCENTILE: Status: ACTIVE | Noted: 2021-10-22

## 2022-03-19 PROBLEM — L70.9 ACNE: Status: ACTIVE | Noted: 2019-09-26

## 2022-03-19 PROBLEM — G89.29 CHRONIC PAIN OF LEFT KNEE: Status: ACTIVE | Noted: 2021-10-22

## 2022-03-19 PROBLEM — L65.9 HAIR LOSS: Status: ACTIVE | Noted: 2019-09-26

## 2022-03-20 PROBLEM — N40.0 BENIGN PROSTATIC HYPERPLASIA: Status: ACTIVE | Noted: 2019-09-26

## 2022-03-20 PROBLEM — G89.29 CHRONIC PAIN OF RIGHT KNEE: Status: ACTIVE | Noted: 2021-10-22

## 2022-03-20 PROBLEM — M25.561 CHRONIC PAIN OF RIGHT KNEE: Status: ACTIVE | Noted: 2021-10-22

## 2022-06-28 ENCOUNTER — OFFICE VISIT (OUTPATIENT)
Dept: ORTHOPEDIC SURGERY | Age: 62
End: 2022-06-28
Payer: OTHER GOVERNMENT

## 2022-06-28 VITALS — HEIGHT: 74 IN | WEIGHT: 212 LBS | BODY MASS INDEX: 27.21 KG/M2

## 2022-06-28 DIAGNOSIS — M17.11 ARTHRITIS OF KNEE, RIGHT: Primary | ICD-10-CM

## 2022-06-28 PROCEDURE — 99214 OFFICE O/P EST MOD 30 MIN: CPT | Performed by: ORTHOPAEDIC SURGERY

## 2022-06-28 PROCEDURE — 20610 DRAIN/INJ JOINT/BURSA W/O US: CPT | Performed by: ORTHOPAEDIC SURGERY

## 2022-06-28 RX ORDER — TRIAMCINOLONE ACETONIDE 40 MG/ML
40 INJECTION, SUSPENSION INTRA-ARTICULAR; INTRAMUSCULAR ONCE
Status: COMPLETED | OUTPATIENT
Start: 2022-06-28 | End: 2022-06-28

## 2022-06-28 RX ADMIN — TRIAMCINOLONE ACETONIDE 40 MG: 40 INJECTION, SUSPENSION INTRA-ARTICULAR; INTRAMUSCULAR at 08:25

## 2022-06-28 NOTE — PROGRESS NOTES
Rylee Duran (: 1960) is a 59 y.o. male patient, here for evaluation of the following chief complaint(s):  Knee Pain (right knee pain)       ASSESSMENT/PLAN:  Below is the assessment and plan developed based on review of pertinent history, physical exam, labs, studies, and medications. 27-year-old male comes in today for evaluation of right knee pain. He is scheduled for a left total knee by another surgeon in a couple of weeks. His right knee reveals significant medial joint space narrowing. I independently reviewed his x-rays from previous visit by another physician. We discussed options for his right knee. I think an injection would be helpful for his upcoming total knee replacement on the other side. I injected 40 mg of triamcinolone into his right knee joint. He tolerated very well. Plan for follow-up with his surgeon    1. Arthritis of knee, right  -     DRAIN/INJECT LARGE JOINT/BURSA      Encounter Diagnosis   Name Primary?  Arthritis of knee, right Yes        No follow-ups on file. SUBJECTIVE/OBJECTIVE:  Rylee Duran (: 1960) is a 59 y.o. male who presents today for the following:  Chief Complaint   Patient presents with    Knee Pain     right knee pain       27-year-old male comes in today for evaluation of right medially based knee pain. Is been going on for a while. The left hurts worse than the right. He is scheduled for total knee replacement on the left side. It is unclear why he came in for right knee evaluation here instead of with his surgeon however he was asking for potential cortisone injection    IMAGING:  XR Results (most recent):  Results from Appointment encounter on 11/15/21    XR KNEE RT MIN 4 V    Narrative  Right knee 4 view x-rays show no evidence of a fracture or dislocation. Evidence of end-stage bone-on-bone osteoarthritis.        No Known Allergies    Current Outpatient Medications   Medication Sig    Fortesta 10 mg/0.5 gram /actuation glpm  zolpidem (AMBIEN) 5 mg tablet Take 1 Tablet by mouth nightly as needed for Sleep. Max Daily Amount: 5 mg.  tretinoin (Avita) 0.025 % topical cream Avita 0.025 % topical cream    tadalafiL (Cialis) 10 mg tablet Cialis 10 mg tablet  Indications: the inability to have an erection    ALPRAZolam (XANAX) 0.5 mg tablet Take 1 Tablet by mouth nightly as needed for Anxiety. Max Daily Amount: 0.5 mg.    finasteride (PROSCAR) 5 mg tablet Take 0.5 Tabs by mouth daily. (Patient taking differently: Take  by mouth daily. 0.25 of A  tab)    propranolol (INDERAL) 20 mg tablet Take 1 Tab by mouth daily. (Patient taking differently: Take 20 mg by mouth as needed.)    saw palmetto xtr/zinc picolin (SAW PALMETTO EXTRACT PO) Take  by mouth.  cpap machine kit by Does Not Apply route. Current Facility-Administered Medications   Medication    triamcinolone acetonide (KENALOG-40) 40 mg/mL injection 40 mg       Past Medical History:   Diagnosis Date    Arthritis         Past Surgical History:   Procedure Laterality Date    HX CATARACT REMOVAL Bilateral 11/2019    HX HERNIA REPAIR      HX KNEE ARTHROSCOPY      HX ORTHOPAEDIC      HX VASECTOMY      HX VEIN STRIPPING      [place] KNEE SCOPE, Vainupea 50 TRANSPLANT Bilateral        Family History   Problem Relation Age of Onset    Heart Disease Mother     Hypertension Mother     Cancer Father         Social History     Tobacco Use    Smoking status: Never Smoker    Smokeless tobacco: Never Used   Substance Use Topics    Alcohol use:  Yes     Alcohol/week: 7.0 standard drinks     Types: 7 Standard drinks or equivalent per week        All systems reviewed x 12 and were negative with the exception of None      Pain Assessment  11/15/2021   Location of Pain Knee   Location Modifiers Left;Right   Quality of Pain Sharp   Frequency of Pain Constant   Aggravating Factors Standing;Walking          Vitals:  Ht 6' 2\" (1.88 m)   Wt 212 lb (96.2 kg)   BMI 27.22 kg/m²    Body mass index is 27.22 kg/m². Physical Exam    General: NAD, well developed, well nourished, alert and oriented x 3. Cardiac: Extremities well perfused    Respiratory: Nonlabored breathing    LLE: Normal gait and station. Negative stinchfield. Minimal effusion noted. No previous incisions noted. ROM 0-120 degrees. Grossly stable to varus/valgus stress and anterior/posterior drawer tests. Tender medially and laterally. Motor grossly intact. RLE: Mild antalgic gait. Minimal effusion noted. No previous incisions noted. ROM 0-120 degrees. Grossly stable to varus/valgus stress and anterior/posterior drawer tests. Medial joint tenderness motor grossly intact. Vascular: Palpable pedal pulses, equal bilaterally. Skin: Warm well perfused, cap refill < 2 sec. An electronic signature was used to authenticate this note.   -- Jacobo Ibarra MD

## 2022-08-29 ENCOUNTER — OFFICE VISIT (OUTPATIENT)
Dept: INTERNAL MEDICINE CLINIC | Age: 62
End: 2022-08-29
Payer: OTHER GOVERNMENT

## 2022-08-29 VITALS
HEART RATE: 73 BPM | SYSTOLIC BLOOD PRESSURE: 123 MMHG | RESPIRATION RATE: 16 BRPM | DIASTOLIC BLOOD PRESSURE: 79 MMHG | BODY MASS INDEX: 26.44 KG/M2 | TEMPERATURE: 97.5 F | OXYGEN SATURATION: 97 % | WEIGHT: 206 LBS | HEIGHT: 74 IN

## 2022-08-29 DIAGNOSIS — R10.13 EPIGASTRIC PAIN: Primary | ICD-10-CM

## 2022-08-29 DIAGNOSIS — F41.9 ANXIETY: ICD-10-CM

## 2022-08-29 PROCEDURE — 99213 OFFICE O/P EST LOW 20 MIN: CPT | Performed by: INTERNAL MEDICINE

## 2022-08-29 RX ORDER — ALPRAZOLAM 0.5 MG/1
0.5 TABLET ORAL
Qty: 30 TABLET | Refills: 1 | Status: SHIPPED | OUTPATIENT
Start: 2022-08-29

## 2022-08-29 RX ORDER — OMEPRAZOLE 40 MG/1
40 CAPSULE, DELAYED RELEASE ORAL DAILY
Qty: 14 CAPSULE | Refills: 0 | Status: SHIPPED | OUTPATIENT
Start: 2022-08-29 | End: 2022-09-20 | Stop reason: SDUPTHER

## 2022-08-29 RX ORDER — LANOLIN ALCOHOL/MO/W.PET/CERES
1 CREAM (GRAM) TOPICAL DAILY
COMMUNITY

## 2022-08-29 RX ORDER — SUCRALFATE 1 G/1
1 TABLET ORAL 2 TIMES DAILY
Qty: 60 TABLET | Refills: 0 | Status: SHIPPED | OUTPATIENT
Start: 2022-08-29

## 2022-08-29 RX ORDER — CELECOXIB 50 MG/1
50 CAPSULE ORAL 2 TIMES DAILY
COMMUNITY

## 2022-08-29 RX ORDER — GLUCOSAMINE SULFATE 1500 MG
1000 POWDER IN PACKET (EA) ORAL DAILY
COMMUNITY

## 2022-08-29 NOTE — PROGRESS NOTES
Verified name and birth date for privacy precautions. Chart reviewed in preparation for today's visit. Chief Complaint   Patient presents with    GERD          Health Maintenance Due   Topic    COVID-19 Vaccine (4 - Booster for Pfizer series)         Wt Readings from Last 3 Encounters:   08/29/22 206 lb (93.4 kg)   06/28/22 212 lb (96.2 kg)   02/21/22 212 lb (96.2 kg)     Temp Readings from Last 3 Encounters:   08/29/22 97.5 °F (36.4 °C)   12/14/21 97.5 °F (36.4 °C) (Temporal)   12/11/20 97.2 °F (36.2 °C)     BP Readings from Last 3 Encounters:   08/29/22 123/79   12/14/21 125/75   12/11/20 120/88     Pulse Readings from Last 3 Encounters:   08/29/22 73   12/14/21 66   12/11/20 71         Learning Assessment:  :     Learning Assessment 8/28/2018   PRIMARY LEARNER Patient   HIGHEST LEVEL OF EDUCATION - PRIMARY LEARNER  > 4 YEARS OF COLLEGE   BARRIERS PRIMARY LEARNER NONE   PRIMARY LANGUAGE ENGLISH   LEARNER PREFERENCE PRIMARY LISTENING   ANSWERED BY pt   RELATIONSHIP SELF       Depression Screening:  :     3 most recent PHQ Screens 8/29/2022   Little interest or pleasure in doing things Not at all   Feeling down, depressed, irritable, or hopeless Not at all   Total Score PHQ 2 0       Fall Risk Assessment:  :     Fall Risk Assessment, last 12 mths 8/28/2018   Able to walk? Yes   Fall in past 12 months? Yes   Number of falls in past 12 months 1   Fall with injury? 0       Abuse Screening:  :     Abuse Screening Questionnaire 8/29/2022 12/14/2021 8/28/2018   Do you ever feel afraid of your partner? N N N   Are you in a relationship with someone who physically or mentally threatens you? N N N   Is it safe for you to go home?  Andrew Hunter

## 2022-08-29 NOTE — PROGRESS NOTES
Acute Care Note    SAMUEL MENDEZ is 59 y.o. male. he presents for evaluation of GERD      GI Review  Patient complains of GERD. His symptoms include heartburn and midepigastric pain. He denies early satiety and nausea. He has identified the following triggers: eating at night. Medical therapy currently involves proton pump inhibitors. He used 2 weeks of nexium but has stopped this. He has had symptoms of GERD for over 2 years. He has not had an endoscopic evaluation. We discussed that he will likely need an EGD. Prior to Admission medications    Medication Sig Start Date End Date Taking? Authorizing Provider   celecoxib (CeleBREX) 50 mg capsule Take 50 mg by mouth two (2) times a day. Yes Provider, Historical   turmeric (CURCUMIN) three (3) times daily. Yes Provider, Historical   glucosamine-chondroitin (ARTHX) 500-400 mg cap Take 1 Capsule by mouth daily. Yes Provider, Historical   cholecalciferol (Vitamin D3) 25 mcg (1,000 unit) cap Take 1,000 Units by mouth daily. Yes Provider, Historical   omeprazole (PRILOSEC) 40 mg capsule Take 1 Capsule by mouth daily. 8/29/22  Yes Irwin Davey MD   sucralfate (CARAFATE) 1 gram tablet Take 1 Tablet by mouth two (2) times a day. 8/29/22  Yes Irwin Davey MD   ALPRAZolam Gisel Reese) 0.5 mg tablet Take 1 Tablet by mouth nightly as needed for Anxiety. Max Daily Amount: 0.5 mg. 8/29/22  Yes Irwin Davey MD   Blenda Conner 10 mg/0.5 gram Lucero Fey glpm daily. 12/21/21  Yes Provider, Historical   zolpidem (AMBIEN) 5 mg tablet Take 1 Tablet by mouth nightly as needed for Sleep. Max Daily Amount: 5 mg. 12/14/21  Yes Irwin Davey MD   tretinoin (Avita) 0.025 % topical cream Avita 0.025 % topical cream 12/14/21  Yes Irwin Davey MD   finasteride (PROSCAR) 5 mg tablet Take 0.5 Tabs by mouth daily. Patient taking differently: Take  by mouth daily.  0.25 of A  tab 12/11/20  Yes Irwin Davey MD   propranolol (INDERAL) 20 mg tablet Take 1 Tab by mouth daily. Patient taking differently: Take 20 mg by mouth as needed. 11/21/19  Yes Ezekiel Dakin, MD   saw palmetto xtr/zinc picolin (SAW PALMETTO EXTRACT PO) Take  by mouth. Yes Provider, Historical   cpap machine kit by Does Not Apply route. Yes Provider, Historical   tadalafiL (Cialis) 10 mg tablet Cialis 10 mg tablet  Indications: the inability to have an erection 12/14/21   Ezekiel Dakin, MD   ALPRAZocolt Long) 0.5 mg tablet Take 1 Tablet by mouth nightly as needed for Anxiety. Max Daily Amount: 0.5 mg. 12/14/21 8/29/22  Ezekiel Dakin, MD         Patient Active Problem List   Diagnosis Code    Hair loss L65.9    Acne L70.9    Benign prostatic hyperplasia N40.0    Chronic pain of left knee M25.562, G89.29    Chronic pain of right knee M25.561, G89.29    Primary osteoarthritis of right knee M17.11    Primary osteoarthritis of left knee M17.12    Weight above 97th percentile Z78.9         Review of Systems   Constitutional: Negative. Respiratory: Negative. Cardiovascular: Negative. Gastrointestinal: Negative. Visit Vitals  /79   Pulse 73   Temp 97.5 °F (36.4 °C)   Resp 16   Ht 6' 2\" (1.88 m)   Wt 206 lb (93.4 kg)   SpO2 97%   BMI 26.45 kg/m²       Physical Exam  Constitutional:       Appearance: Normal appearance. He is well-developed. HENT:      Head: Normocephalic. Mouth/Throat:      Pharynx: No oropharyngeal exudate. Cardiovascular:      Rate and Rhythm: Normal rate and regular rhythm. Heart sounds: Normal heart sounds. No murmur heard. Pulmonary:      Effort: Pulmonary effort is normal.      Breath sounds: Normal breath sounds. No wheezing. Musculoskeletal:      Cervical back: Normal range of motion. Lymphadenopathy:      Cervical: No cervical adenopathy. Skin:     Findings: No rash. Neurological:      Mental Status: He is alert. ASSESSMENT/PLAN  Diagnoses and all orders for this visit:    1.  Epigastric pain - Advised the patient to resume his PPI, begin carafate and to follow up with GI. Referral given. -     omeprazole (PRILOSEC) 40 mg capsule; Take 1 Capsule by mouth daily. -     sucralfate (CARAFATE) 1 gram tablet; Take 1 Tablet by mouth two (2) times a day. -     REFERRAL TO GASTROENTEROLOGY    2. Anxiety  - Refill on Xanax today. -     ALPRAZolam (XANAX) 0.5 mg tablet; Take 1 Tablet by mouth nightly as needed for Anxiety. Max Daily Amount: 0.5 mg. Advised the patient to call back or return to office if symptoms worsen/change/persist.   Discussed expected course/resolution/complications of diagnosis in detail with patient. Medication risks/benefits/costs/interactions/alternatives discussed with patient. The patient was given an after visit summary which includes diagnoses, current medications, & vitals. They expressed understanding with the diagnosis and plan.

## 2022-09-20 ENCOUNTER — PATIENT MESSAGE (OUTPATIENT)
Dept: INTERNAL MEDICINE CLINIC | Age: 62
End: 2022-09-20

## 2022-09-20 DIAGNOSIS — R10.13 EPIGASTRIC PAIN: ICD-10-CM

## 2022-09-20 RX ORDER — OMEPRAZOLE 40 MG/1
40 CAPSULE, DELAYED RELEASE ORAL DAILY
Qty: 14 CAPSULE | Refills: 0 | Status: SHIPPED | OUTPATIENT
Start: 2022-09-20

## 2023-01-18 ENCOUNTER — OFFICE VISIT (OUTPATIENT)
Dept: INTERNAL MEDICINE CLINIC | Age: 63
End: 2023-01-18
Payer: OTHER GOVERNMENT

## 2023-01-18 VITALS
DIASTOLIC BLOOD PRESSURE: 82 MMHG | BODY MASS INDEX: 26.67 KG/M2 | HEIGHT: 74 IN | TEMPERATURE: 97.8 F | OXYGEN SATURATION: 97 % | RESPIRATION RATE: 16 BRPM | WEIGHT: 207.8 LBS | HEART RATE: 66 BPM | SYSTOLIC BLOOD PRESSURE: 120 MMHG

## 2023-01-18 DIAGNOSIS — K21.9 GASTROESOPHAGEAL REFLUX DISEASE WITHOUT ESOPHAGITIS: ICD-10-CM

## 2023-01-18 DIAGNOSIS — Z12.5 PROSTATE CANCER SCREENING: ICD-10-CM

## 2023-01-18 DIAGNOSIS — L70.9 ACNE, UNSPECIFIED ACNE TYPE: ICD-10-CM

## 2023-01-18 DIAGNOSIS — F41.9 ANXIETY: ICD-10-CM

## 2023-01-18 DIAGNOSIS — Z00.00 WELL ADULT EXAM: ICD-10-CM

## 2023-01-18 DIAGNOSIS — F51.01 PRIMARY INSOMNIA: ICD-10-CM

## 2023-01-18 DIAGNOSIS — Z00.00 WELL ADULT EXAM: Primary | ICD-10-CM

## 2023-01-18 PROCEDURE — 99396 PREV VISIT EST AGE 40-64: CPT | Performed by: INTERNAL MEDICINE

## 2023-01-18 RX ORDER — TRETINOIN 0.25 MG/G
CREAM TOPICAL
Qty: 20 G | Refills: 3 | Status: SHIPPED | OUTPATIENT
Start: 2023-01-18

## 2023-01-18 RX ORDER — ALPRAZOLAM 0.5 MG/1
0.5 TABLET ORAL
Qty: 30 TABLET | Refills: 1 | Status: SHIPPED | OUTPATIENT
Start: 2023-01-18

## 2023-01-18 RX ORDER — ZOLPIDEM TARTRATE 5 MG/1
5 TABLET ORAL
Qty: 90 TABLET | Refills: 1 | Status: SHIPPED | OUTPATIENT
Start: 2023-01-18

## 2023-01-18 NOTE — PROGRESS NOTES
Comprehensive Physical Examination    Anat Vazquez is a 57 y.o. male. he presents for a comprehensive physical examination. He has been [place] a previous left knee surgery. This is improving slowly. He denies pain at the moment. We discussed that he has not been as mobile as he would like due to his knee. He was previously a very active person. Using [place] every other night alternating with with Xanax for sleep. Patient Active Problem List    Diagnosis Date Noted    Chronic pain of left knee 10/22/2021    Chronic pain of right knee 10/22/2021    Primary osteoarthritis of right knee 10/22/2021    Primary osteoarthritis of left knee 10/22/2021    Weight above 97th percentile 10/22/2021    Hair loss 09/26/2019    Acne 09/26/2019    Benign prostatic hyperplasia 09/26/2019     Current Outpatient Medications   Medication Sig Dispense Refill    celecoxib (CELEBREX) 50 mg capsule Take 50 mg by mouth two (2) times a day. turmeric (CURCUMIN) three (3) times daily. cholecalciferol (VITAMIN D3) 25 mcg (1,000 unit) cap Take 1,000 Units by mouth daily. ALPRAZolam (XANAX) 0.5 mg tablet Take 1 Tablet by mouth nightly as needed for Anxiety. Max Daily Amount: 0.5 mg. 30 Tablet 1    Fortesta 10 mg/0.5 gram /actuation glpm daily. zolpidem (AMBIEN) 5 mg tablet Take 1 Tablet by mouth nightly as needed for Sleep. Max Daily Amount: 5 mg. 90 Tablet 1    tretinoin (Avita) 0.025 % topical cream Avita 0.025 % topical cream 20 g 3    tadalafiL (Cialis) 10 mg tablet Cialis 10 mg tablet  Indications: the inability to have an erection 30 Tablet 3    finasteride (PROSCAR) 5 mg tablet Take 0.5 Tabs by mouth daily. (Patient taking differently: Take  by mouth daily. 0.25 of A  tab) 60 Tab 3    propranolol (INDERAL) 20 mg tablet Take 1 Tab by mouth daily. (Patient taking differently: Take 20 mg by mouth as needed.) 90 Tab 1    saw palmetto xtr/zinc picolin (SAW PALMETTO EXTRACT PO) Take  by mouth.       cpap machine kit by Does Not Apply route. omeprazole (PRILOSEC) 40 mg capsule Take 1 Capsule by mouth daily. (Patient not taking: Reported on 1/18/2023) 14 Capsule 0    glucosamine-chondroitin (ARTHX) 500-400 mg cap Take 1 Capsule by mouth daily. (Patient not taking: Reported on 1/18/2023)      sucralfate (CARAFATE) 1 gram tablet Take 1 Tablet by mouth two (2) times a day. (Patient not taking: Reported on 1/18/2023) 60 Tablet 0     No Known Allergies  Past Medical History:   Diagnosis Date    Arthritis      Past Surgical History:   Procedure Laterality Date    HX CATARACT REMOVAL Bilateral 11/2019    HX HERNIA REPAIR      HX KNEE ARTHROSCOPY      HX KNEE REPLACEMENT Left 07/2022    HX ORTHOPAEDIC      HX VASECTOMY      HX VEIN STRIPPING      DE ARTHROSCOPY KNEE MENISCAL TRNSPLJ MED/LAT Bilateral      Family History   Problem Relation Age of Onset    Heart Disease Mother     Hypertension Mother     Cancer Father      Social History     Tobacco Use    Smoking status: Never    Smokeless tobacco: Never   Substance Use Topics    Alcohol use: Yes     Alcohol/week: 7.0 standard drinks     Types: 7 Standard drinks or equivalent per week        Health Maintenance   Topic Date Due    COVID-19 Vaccine (4 - Booster for Pfizer series) 12/20/2021    Flu Vaccine (1) 08/01/2022    Depression Screen  08/29/2023    Colorectal Cancer Screening Combo  01/23/2025    Lipid Screen  12/14/2026    DTaP/Tdap/Td series (2 - Td or Tdap) 10/15/2028    Hepatitis C Screening  Completed    Shingles Vaccine  Completed    Pneumococcal 0-64 years  Aged Out         Review of Systems   Constitutional: Negative. HENT: Negative. Respiratory:  Negative for cough. Cardiovascular:  Negative for chest pain and palpitations. Gastrointestinal: Negative. Musculoskeletal: Negative. All other systems reviewed and are negative.       Visit Vitals  /82   Pulse 66   Temp 97.8 °F (36.6 °C) (Temporal)   Resp 16   Ht 6' 2\" (1.88 m)   Wt 207 lb 12.8 oz (94.3 kg)   SpO2 97%   BMI 26.68 kg/m²       Physical Exam  Constitutional:       General: He is not in acute distress. Cardiovascular:      Rate and Rhythm: Normal rate and regular rhythm. Heart sounds: No murmur heard. Pulmonary:      Effort: Pulmonary effort is normal.      Breath sounds: Normal breath sounds. Abdominal:      General: Bowel sounds are normal.      Palpations: Abdomen is soft. Musculoskeletal:      Cervical back: Normal range of motion. Lymphadenopathy:      Cervical: No cervical adenopathy. Neurological:      General: No focal deficit present. ASSESSMENT/PLAN  Diagnoses and all orders for this visit:    1. Well adult exam  -     LIPID PANEL; Future    2. Anxiety  -     ALPRAZolam (XANAX) 0.5 mg tablet; Take 1 Tablet by mouth nightly as needed for Anxiety. Max Daily Amount: 0.5 mg.    3. Gastroesophageal reflux disease without esophagitis    4. Acne, unspecified acne type  -     tretinoin (Avita) 0.025 % topical cream; Avita 0.025 % topical cream    5. Primary insomnia  -     zolpidem (AMBIEN) 5 mg tablet; Take 1 Tablet by mouth nightly as needed for Sleep. Max Daily Amount: 5 mg. 6. Prostate cancer screening  -     PSA SCREENING (SCREENING);  Future

## 2023-01-18 NOTE — PROGRESS NOTES
Chief Complaint   Patient presents with    Complete Physical     Reviewed record in preparation for visit and have obtained necessary documentation. Identified pt with two pt identifiers(name and ). Health Maintenance Due   Topic    COVID-19 Vaccine (4 - Booster for Pfizer series)    Flu Vaccine (1)         Chief Complaint   Patient presents with    Complete Physical        Wt Readings from Last 3 Encounters:   23 207 lb 12.8 oz (94.3 kg)   22 206 lb (93.4 kg)   22 212 lb (96.2 kg)     Temp Readings from Last 3 Encounters:   23 97.8 °F (36.6 °C) (Temporal)   22 97.5 °F (36.4 °C)   21 97.5 °F (36.4 °C) (Temporal)     BP Readings from Last 3 Encounters:   23 120/82   22 123/79   21 125/75     Pulse Readings from Last 3 Encounters:   23 66   22 73   21 66           Learning Assessment:  :     Learning Assessment 2018   PRIMARY LEARNER Patient   HIGHEST LEVEL OF EDUCATION - PRIMARY LEARNER  > 4 YEARS OF COLLEGE   BARRIERS PRIMARY LEARNER NONE   PRIMARY LANGUAGE ENGLISH   LEARNER PREFERENCE PRIMARY LISTENING   ANSWERED BY pt   RELATIONSHIP SELF       Depression Screening:  :     3 most recent PHQ Screens 2023   Little interest or pleasure in doing things Not at all   Feeling down, depressed, irritable, or hopeless Not at all   Total Score PHQ 2 0       Fall Risk Assessment:  :     Fall Risk Assessment, last 12 mths 2018   Able to walk? Yes   Fall in past 12 months? Yes   Number of falls in past 12 months 1   Fall with injury? 0       Abuse Screening:  :     Abuse Screening Questionnaire 2021   Do you ever feel afraid of your partner? N N N N   Are you in a relationship with someone who physically or mentally threatens you? N N N N   Is it safe for you to go home?  Colletta Sager       Coordination of Care Questionnaire:  :     1) Have you been to an emergency room, urgent care clinic since your last visit? no   Hospitalized since your last visit? no             2) Have you seen or consulted any other health care providers outside of [address] [place] [address] since your last visit? no  (Include any pap smears or colon screenings in this section.)    3) Do you have an Advance Directive on file? no    4) Are you interested in receiving information on Advance Directives? NO      Patient is accompanied by self I have received verbal consent from [facility] to discuss any/all medical information while they are present in the room. Reviewed record  In preparation for visit and have obtained necessary documentation.

## 2023-01-19 LAB
CHOLEST SERPL-MCNC: 188 MG/DL
HDLC SERPL-MCNC: 49 MG/DL
HDLC SERPL: 3.8 (ref 0–5)
LDLC SERPL CALC-MCNC: 104.8 MG/DL (ref 0–100)
PSA SERPL-MCNC: 0.4 NG/ML (ref 0.01–4)
TRIGL SERPL-MCNC: 171 MG/DL (ref ?–150)
VLDLC SERPL CALC-MCNC: 34.2 MG/DL